# Patient Record
Sex: FEMALE | Race: WHITE | Employment: FULL TIME | URBAN - METROPOLITAN AREA
[De-identification: names, ages, dates, MRNs, and addresses within clinical notes are randomized per-mention and may not be internally consistent; named-entity substitution may affect disease eponyms.]

---

## 2018-09-14 ENCOUNTER — APPOINTMENT (OUTPATIENT)
Dept: GENERAL RADIOLOGY | Age: 28
End: 2018-09-14
Payer: COMMERCIAL

## 2018-09-14 ENCOUNTER — HOSPITAL ENCOUNTER (EMERGENCY)
Age: 28
Discharge: HOME | End: 2018-09-14
Attending: EMERGENCY MEDICINE
Payer: COMMERCIAL

## 2018-09-14 VITALS
DIASTOLIC BLOOD PRESSURE: 67 MMHG | OXYGEN SATURATION: 97 % | SYSTOLIC BLOOD PRESSURE: 124 MMHG | TEMPERATURE: 101.2 F | WEIGHT: 133 LBS | RESPIRATION RATE: 14 BRPM | HEIGHT: 63 IN | HEART RATE: 90 BPM | BODY MASS INDEX: 23.57 KG/M2

## 2018-09-14 DIAGNOSIS — J18.9 COMMUNITY ACQUIRED PNEUMONIA OF RIGHT LOWER LOBE OF LUNG: Primary | ICD-10-CM

## 2018-09-14 LAB
ALBUMIN SERPL-MCNC: 4.6 G/DL (ref 3.5–5.2)
ALP BLD-CCNC: 86 U/L (ref 35–104)
ALT SERPL-CCNC: 16 U/L (ref 0–32)
ANION GAP SERPL CALCULATED.3IONS-SCNC: 14 MMOL/L (ref 7–16)
AST SERPL-CCNC: 19 U/L (ref 0–31)
BACTERIA: ABNORMAL /HPF
BASOPHILS ABSOLUTE: 0.04 E9/L (ref 0–0.2)
BASOPHILS RELATIVE PERCENT: 0.3 % (ref 0–2)
BILIRUB SERPL-MCNC: 0.6 MG/DL (ref 0–1.2)
BILIRUBIN URINE: NEGATIVE
BLOOD, URINE: NORMAL
BUN BLDV-MCNC: 10 MG/DL (ref 6–20)
C-REACTIVE PROTEIN: 8.8 MG/DL (ref 0–0.4)
CALCIUM SERPL-MCNC: 9.5 MG/DL (ref 8.6–10.2)
CHLORIDE BLD-SCNC: 98 MMOL/L (ref 98–107)
CHP ED QC CHECK: NORMAL
CLARITY: CLEAR
CO2: 23 MMOL/L (ref 22–29)
COLOR: YELLOW
CREAT SERPL-MCNC: 0.8 MG/DL (ref 0.5–1)
EKG ATRIAL RATE: 103 BPM
EKG P AXIS: 56 DEGREES
EKG P-R INTERVAL: 152 MS
EKG Q-T INTERVAL: 322 MS
EKG QRS DURATION: 88 MS
EKG QTC CALCULATION (BAZETT): 421 MS
EKG R AXIS: 5 DEGREES
EKG T AXIS: 33 DEGREES
EKG VENTRICULAR RATE: 103 BPM
EOSINOPHILS ABSOLUTE: 0 E9/L (ref 0.05–0.5)
EOSINOPHILS RELATIVE PERCENT: 0 % (ref 0–6)
EPITHELIAL CELLS, UA: ABNORMAL /HPF
GFR AFRICAN AMERICAN: >60
GFR NON-AFRICAN AMERICAN: >60 ML/MIN/1.73
GLUCOSE BLD-MCNC: 107 MG/DL (ref 74–109)
GLUCOSE URINE: NEGATIVE MG/DL
HCT VFR BLD CALC: 39.8 % (ref 34–48)
HEMOGLOBIN: 13.4 G/DL (ref 11.5–15.5)
IMMATURE GRANULOCYTES #: 0.06 E9/L
IMMATURE GRANULOCYTES %: 0.5 % (ref 0–5)
KETONES, URINE: NEGATIVE MG/DL
LACTIC ACID: 0.9 MMOL/L (ref 0.5–2.2)
LEUKOCYTE ESTERASE, URINE: NEGATIVE
LIPASE: 15 U/L (ref 13–60)
LYMPHOCYTES ABSOLUTE: 0.94 E9/L (ref 1.5–4)
LYMPHOCYTES RELATIVE PERCENT: 7.3 % (ref 20–42)
MCH RBC QN AUTO: 30.7 PG (ref 26–35)
MCHC RBC AUTO-ENTMCNC: 33.7 % (ref 32–34.5)
MCV RBC AUTO: 91.1 FL (ref 80–99.9)
MONOCYTES ABSOLUTE: 0.77 E9/L (ref 0.1–0.95)
MONOCYTES RELATIVE PERCENT: 6 % (ref 2–12)
NEUTROPHILS ABSOLUTE: 11.09 E9/L (ref 1.8–7.3)
NEUTROPHILS RELATIVE PERCENT: 85.9 % (ref 43–80)
NITRITE, URINE: NEGATIVE
PDW BLD-RTO: 12.5 FL (ref 11.5–15)
PH UA: 7.5 (ref 5–9)
PLATELET # BLD: 246 E9/L (ref 130–450)
PMV BLD AUTO: 10.1 FL (ref 7–12)
POTASSIUM SERPL-SCNC: 3.8 MMOL/L (ref 3.5–5)
PREGNANCY TEST URINE, POC: NEGATIVE
PROTEIN UA: NEGATIVE MG/DL
RBC # BLD: 4.37 E12/L (ref 3.5–5.5)
RBC UA: ABNORMAL /HPF (ref 0–2)
SEDIMENTATION RATE, ERYTHROCYTE: 38 MM/HR (ref 0–20)
SODIUM BLD-SCNC: 135 MMOL/L (ref 132–146)
SPECIFIC GRAVITY UA: 1.01 (ref 1–1.03)
TOTAL PROTEIN: 8.3 G/DL (ref 6.4–8.3)
UROBILINOGEN, URINE: 0.2 E.U./DL
WBC # BLD: 12.9 E9/L (ref 4.5–11.5)
WBC UA: ABNORMAL /HPF (ref 0–5)

## 2018-09-14 PROCEDURE — 6370000000 HC RX 637 (ALT 250 FOR IP): Performed by: EMERGENCY MEDICINE

## 2018-09-14 PROCEDURE — 86140 C-REACTIVE PROTEIN: CPT

## 2018-09-14 PROCEDURE — 36415 COLL VENOUS BLD VENIPUNCTURE: CPT

## 2018-09-14 PROCEDURE — 85025 COMPLETE CBC W/AUTO DIFF WBC: CPT

## 2018-09-14 PROCEDURE — 96365 THER/PROPH/DIAG IV INF INIT: CPT

## 2018-09-14 PROCEDURE — 6360000002 HC RX W HCPCS: Performed by: EMERGENCY MEDICINE

## 2018-09-14 PROCEDURE — 83690 ASSAY OF LIPASE: CPT

## 2018-09-14 PROCEDURE — 85651 RBC SED RATE NONAUTOMATED: CPT

## 2018-09-14 PROCEDURE — 87088 URINE BACTERIA CULTURE: CPT

## 2018-09-14 PROCEDURE — 71045 X-RAY EXAM CHEST 1 VIEW: CPT

## 2018-09-14 PROCEDURE — 83605 ASSAY OF LACTIC ACID: CPT

## 2018-09-14 PROCEDURE — 94664 DEMO&/EVAL PT USE INHALER: CPT

## 2018-09-14 PROCEDURE — 93005 ELECTROCARDIOGRAM TRACING: CPT | Performed by: EMERGENCY MEDICINE

## 2018-09-14 PROCEDURE — 80053 COMPREHEN METABOLIC PANEL: CPT

## 2018-09-14 PROCEDURE — 96375 TX/PRO/DX INJ NEW DRUG ADDON: CPT

## 2018-09-14 PROCEDURE — 2580000003 HC RX 258: Performed by: EMERGENCY MEDICINE

## 2018-09-14 PROCEDURE — 87040 BLOOD CULTURE FOR BACTERIA: CPT

## 2018-09-14 PROCEDURE — 81001 URINALYSIS AUTO W/SCOPE: CPT

## 2018-09-14 PROCEDURE — 99283 EMERGENCY DEPT VISIT LOW MDM: CPT

## 2018-09-14 RX ORDER — MULTIVIT-MIN/IRON/FOLIC ACID/K 18-600-40
CAPSULE ORAL
COMMUNITY
End: 2021-09-16

## 2018-09-14 RX ORDER — ONDANSETRON 2 MG/ML
4 INJECTION INTRAMUSCULAR; INTRAVENOUS ONCE
Status: COMPLETED | OUTPATIENT
Start: 2018-09-14 | End: 2018-09-14

## 2018-09-14 RX ORDER — 0.9 % SODIUM CHLORIDE 0.9 %
1000 INTRAVENOUS SOLUTION INTRAVENOUS ONCE
Status: COMPLETED | OUTPATIENT
Start: 2018-09-14 | End: 2018-09-14

## 2018-09-14 RX ORDER — DOXYCYCLINE HYCLATE 100 MG
100 TABLET ORAL 2 TIMES DAILY
Qty: 20 TABLET | Refills: 0 | Status: SHIPPED | OUTPATIENT
Start: 2018-09-14 | End: 2018-09-24

## 2018-09-14 RX ORDER — SODIUM CHLORIDE 0.9 % (FLUSH) 0.9 %
10 SYRINGE (ML) INJECTION PRN
Status: DISCONTINUED | OUTPATIENT
Start: 2018-09-14 | End: 2018-09-14 | Stop reason: HOSPADM

## 2018-09-14 RX ORDER — KETOROLAC TROMETHAMINE 30 MG/ML
30 INJECTION, SOLUTION INTRAMUSCULAR; INTRAVENOUS ONCE
Status: COMPLETED | OUTPATIENT
Start: 2018-09-14 | End: 2018-09-14

## 2018-09-14 RX ORDER — ONDANSETRON 4 MG/1
4 TABLET, ORALLY DISINTEGRATING ORAL EVERY 8 HOURS PRN
Qty: 10 TABLET | Refills: 0 | Status: SHIPPED | OUTPATIENT
Start: 2018-09-14 | End: 2019-09-14

## 2018-09-14 RX ORDER — CEFDINIR 300 MG/1
300 CAPSULE ORAL 2 TIMES DAILY
Qty: 20 CAPSULE | Refills: 0 | Status: SHIPPED | OUTPATIENT
Start: 2018-09-14 | End: 2018-09-24

## 2018-09-14 RX ORDER — ACETAMINOPHEN 325 MG/1
650 TABLET ORAL ONCE
Status: COMPLETED | OUTPATIENT
Start: 2018-09-14 | End: 2018-09-14

## 2018-09-14 RX ORDER — IPRATROPIUM BROMIDE AND ALBUTEROL SULFATE 2.5; .5 MG/3ML; MG/3ML
1 SOLUTION RESPIRATORY (INHALATION) ONCE
Status: COMPLETED | OUTPATIENT
Start: 2018-09-14 | End: 2018-09-14

## 2018-09-14 RX ORDER — FLUOXETINE HYDROCHLORIDE 20 MG/1
40 CAPSULE ORAL DAILY
COMMUNITY
End: 2021-09-16

## 2018-09-14 RX ADMIN — SODIUM CHLORIDE 1000 ML: 9 INJECTION, SOLUTION INTRAVENOUS at 10:18

## 2018-09-14 RX ADMIN — ONDANSETRON 4 MG: 2 INJECTION INTRAMUSCULAR; INTRAVENOUS at 10:18

## 2018-09-14 RX ADMIN — SODIUM CHLORIDE 1000 ML: 9 INJECTION, SOLUTION INTRAVENOUS at 11:20

## 2018-09-14 RX ADMIN — CEFTRIAXONE 1 G: 1 INJECTION, POWDER, FOR SOLUTION INTRAMUSCULAR; INTRAVENOUS at 11:20

## 2018-09-14 RX ADMIN — KETOROLAC TROMETHAMINE 30 MG: 30 INJECTION, SOLUTION INTRAMUSCULAR at 10:18

## 2018-09-14 RX ADMIN — ACETAMINOPHEN 650 MG: 325 TABLET ORAL at 12:18

## 2018-09-14 RX ADMIN — Medication 10 ML: at 10:18

## 2018-09-14 RX ADMIN — IPRATROPIUM BROMIDE AND ALBUTEROL SULFATE 1 AMPULE: .5; 3 SOLUTION RESPIRATORY (INHALATION) at 12:03

## 2018-09-14 ASSESSMENT — ENCOUNTER SYMPTOMS
ABDOMINAL PAIN: 0
BACK PAIN: 0
COUGH: 1
BLOOD IN STOOL: 0
NAUSEA: 1
VOMITING: 1
SHORTNESS OF BREATH: 0

## 2018-09-14 ASSESSMENT — PAIN DESCRIPTION - PAIN TYPE: TYPE: ACUTE PAIN

## 2018-09-14 ASSESSMENT — PAIN DESCRIPTION - LOCATION: LOCATION: HEAD

## 2018-09-14 ASSESSMENT — PAIN SCALES - GENERAL
PAINLEVEL_OUTOF10: 5

## 2018-09-14 NOTE — ED NOTES
ATTENDING PROVIDER ATTESTATION:     Chato Buckner presented to the emergency department for evaluation of Fever (took tyl last at 8 am ); Headache; Dizziness; and Emesis   and was initially evaluated by the Medical Resident. See Original ED Note for H&P and ED course above. I have reviewed and discussed the case, including pertinent history (medical, surgical, family and social) and exam findings with the Medical Resident assigned to Chato Buckner. I have personally performed and/or participated in the history, exam, medical decision making, and procedures and agree with all pertinent clinical information and any additional changes or corrections are noted below in my assessment and plan. I have discussed this patient in detail with the resident, and provided the instruction and education,       I have reviewed my findings and recommendations with the assigned Medical Resident, Chato Buckner and members of family present at the time of disposition. My Assessment/Plan: The following summarizes my clinical findings and independent assessment:     History:  Chato Buckner is a 32 y.o. female presenting to the ED for fever, beginning 5-6 days ago. The complaint has been constant, moderate in severity, and worsened by nothing. Cough, wheezing, shortness of breath. Sick for a few days. Denies other complaints. Still eating and drinking. Still taking PO. Review of Systems:   Pertinent positives and negatives are stated within HPI, all other systems reviewed and are negative.    --------------------------------------------- PAST HISTORY ---------------------------------------------  Past Medical History:  has a past medical history of Asthma; Eczema; Environmental allergies; Left ovarian cyst; and Scoliosis. Past Surgical History:  has a past surgical history that includes Ovarian cyst surgery. Social History:  reports that she quit smoking about 5 years ago.  Her smoking use included Cigarettes. She smoked 0.50 packs per day. She has never used smokeless tobacco. She reports that she does not drink alcohol or use drugs. Family History: family history is not on file. The patients home medications have been reviewed. Allergies: Spearmint flavor  --------------------------------------------------------------------------------------------------------------  Nursing notes and vital signs reviewed             On My Exam:    Constitutional/General: Alert and oriented x3  Head: Normocephalic and atraumatic  Eyes: PERRL, EOMI, conjunctiva normal, sclera non icteric  Mouth: Oropharynx clear, handling secretions, no trismus, no asymmetry of the posterior oropharynx or uvular edema  Neck: Supple, full ROM, no stridor, no meningeal signs  Respiratory: Lungs with rhonchi and wheezes bilaterally. Not in respiratory distress  Cardiovascular:  Tachycardic but Regular rhythm. No murmurs, no aortic murmurs, no gallops, or rubs. 2+ distal pulses. Equal extremity pulses. Chest: No chest wall tenderness  GI:  Abdomen Soft, Non tender, Non distended. +BS. No rebound, guarding, or rigidity. No pulsatile masses. Musculoskeletal: Moves all extremities x 4. Warm and well perfused, no clubbing, cyanosis, or edema. Capillary refill <3 seconds  Integument: skin warm and dry. No rashes. Neurologic: GCS 15, no focal deficits        Medical Decision Making:  CAP  IVF  Antibiotics  Improving  HR improved  She is feeling better  She is appropriate for outpatient therapy      DIAGNOSIS:  1.  Community acquired pneumonia of right lower lobe of lung ([facility] [address]                         Jane Hurt MD  09/14/18 [id number]

## 2018-09-14 NOTE — ED PROVIDER NOTES
59-year-old female presents to emergency department with fevers cough dizziness and headaches worsening over the last 5 or 6 days. Patient also was recently diagnosed with strep and treated 2 weeks ago. The patient states she has had some dry heaves and difficulty tolerating by mouth. She states her fevers but as high as 102.6 she states mild head congestion and states she is just not getting better so desired to be evaluated. She is denying vision changes she is denying neurological deficits she is denying chest pain she is denying diarrhea or constipation she is denying dysuria. The history is provided by the patient. Fever   Max temp prior to arrival:  102.6  Temp source:  Oral  Severity:  Moderate  Onset quality:  Gradual  Duration:  6 days  Timing:  Intermittent  Progression:  Waxing and waning  Chronicity:  New  Relieved by:  Nothing  Worsened by:  Nothing  Associated symptoms: chills, congestion, cough, headaches, nausea and vomiting    Associated symptoms: no chest pain, no dysuria and no rash    Risk factors: recent sickness        Review of Systems   Constitutional: Positive for chills and fever. HENT: Positive for congestion. Respiratory: Positive for cough. Negative for shortness of breath. Cardiovascular: Negative for chest pain. Gastrointestinal: Positive for nausea and vomiting. Negative for abdominal pain and blood in stool. Genitourinary: Negative for dysuria and frequency. Musculoskeletal: Negative for back pain. Skin: Negative for rash. Neurological: Positive for headaches. Negative for weakness. All other systems reviewed and are negative. Physical Exam   Constitutional: She is oriented to person, place, and time. She appears well-developed and well-nourished. HENT:   Head: Normocephalic and atraumatic. Nose:       Mouth/Throat: Uvula is midline and oropharynx is clear and moist. No oropharyngeal exudate or posterior oropharyngeal edema.    Neck: Normal range of motion and full passive range of motion without pain. Neck supple. No spinous process tenderness and no muscular tenderness present. No Kernig's sign noted. Cardiovascular: Regular rhythm. Tachycardia present. Pulmonary/Chest: Effort normal. No tachypnea. She has rales in the right lower field. Abdominal: Soft. Normal appearance and bowel sounds are normal. There is no tenderness. There is no rigidity, no rebound and no guarding. Neurological: She is alert and oriented to person, place, and time. She has normal strength. No cranial nerve deficit or sensory deficit. She exhibits normal muscle tone. She displays a negative Romberg sign. Coordination normal.   Skin: She is not diaphoretic. Vitals reviewed. Procedures    MDM  --------------------------------------------- PAST HISTORY ---------------------------------------------  Past Medical History:  has a past medical history of Asthma; Eczema; Environmental allergies; Left ovarian cyst; and Scoliosis. Past Surgical History:  has a past surgical history that includes Ovarian cyst surgery. Social History:  reports that she quit smoking about 5 years ago. Her smoking use included Cigarettes. She smoked 0.50 packs per day. She has never used smokeless tobacco. She reports that she does not drink alcohol or use drugs. Family History: family history is not on file. The patients home medications have been reviewed.     Allergies: Spearmint flavor    -------------------------------------------------- RESULTS -------------------------------------------------    LABS:  Results for orders placed or performed during the hospital encounter of 09/14/18   CBC Auto Differential   Result Value Ref Range    WBC 12.9 (H) 4.5 - 11.5 E9/L    RBC 4.37 3.50 - 5.50 E12/L    Hemoglobin 13.4 11.5 - 15.5 g/dL    Hematocrit 39.8 34.0 - 48.0 %    MCV 91.1 80.0 - 99.9 fL    MCH 30.7 26.0 - 35.0 pg    MCHC 33.7 32.0 - 34.5 %    RDW 12.5 11.5 - 15.0 fL Platelets 555 349 - 900 E9/L    MPV 10.1 7.0 - 12.0 fL    Neutrophils % 85.9 (H) 43.0 - 80.0 %    Immature Granulocytes % 0.5 0.0 - 5.0 %    Lymphocytes % 7.3 (L) 20.0 - 42.0 %    Monocytes % 6.0 2.0 - 12.0 %    Eosinophils % 0.0 0.0 - 6.0 %    Basophils % 0.3 0.0 - 2.0 %    Neutrophils # 11.09 (H) 1.80 - 7.30 E9/L    Immature Granulocytes # 0.06 E9/L    Lymphocytes # 0.94 (L) 1.50 - 4.00 E9/L    Monocytes # 0.77 0.10 - 0.95 E9/L    Eosinophils # 0.00 (L) 0.05 - 0.50 E9/L    Basophils # 0.04 0.00 - 0.20 E9/L   Comprehensive Metabolic Panel   Result Value Ref Range    Sodium 135 132 - 146 mmol/L    Potassium 3.8 3.5 - 5.0 mmol/L    Chloride 98 98 - 107 mmol/L    CO2 23 22 - 29 mmol/L    Anion Gap 14 7 - 16 mmol/L    Glucose 107 74 - 109 mg/dL    BUN 10 6 - 20 mg/dL    CREATININE 0.8 0.5 - 1.0 mg/dL    GFR Non-African American >60 >=60 mL/min/1.73    GFR African American >60     Calcium 9.5 8.6 - 10.2 mg/dL    Total Protein 8.3 6.4 - 8.3 g/dL    Alb 4.6 3.5 - 5.2 g/dL    Total Bilirubin 0.6 0.0 - 1.2 mg/dL    Alkaline Phosphatase 86 35 - 104 U/L    ALT 16 0 - 32 U/L    AST 19 0 - 31 U/L   Lactic Acid, Plasma   Result Value Ref Range    Lactic Acid 0.9 0.5 - 2.2 mmol/L   Lipase   Result Value Ref Range    Lipase 15 13 - 60 U/L   Urinalysis   Result Value Ref Range    Color, UA Yellow Straw/Yellow    Clarity, UA Clear Clear    Glucose, Ur Negative Negative mg/dL    Bilirubin Urine Negative Negative    Ketones, Urine Negative Negative mg/dL    Specific Gravity, UA 1.010 1.005 - 1.030    Blood, Urine TRACE-LYSED Negative    pH, UA 7.5 5.0 - 9.0    Protein, UA Negative Negative mg/dL    Urobilinogen, Urine 0.2 <2.0 E.U./dL    Nitrite, Urine Negative Negative    Leukocyte Esterase, Urine Negative Negative   Microscopic Urinalysis   Result Value Ref Range    WBC, UA 0-1 0 - 5 /HPF    RBC, UA 1-3 0 - 2 /HPF    Epi Cells MODERATE /HPF    Bacteria, UA RARE (A) /HPF   POC Pregnancy Urine   Result Value Ref Range    Preg Test, Ur

## 2018-09-15 LAB — URINE CULTURE, ROUTINE: NORMAL

## 2018-09-19 LAB
BLOOD CULTURE, ROUTINE: NORMAL
CULTURE, BLOOD 2: NORMAL

## 2019-08-16 ENCOUNTER — PREP FOR PROCEDURE (OUTPATIENT)
Dept: OBGYN | Age: 29
End: 2019-08-16

## 2019-08-16 RX ORDER — ONDANSETRON 2 MG/ML
4 INJECTION INTRAMUSCULAR; INTRAVENOUS EVERY 6 HOURS PRN
Status: DISCONTINUED | OUTPATIENT
Start: 2019-08-16 | End: 2022-03-27 | Stop reason: ALTCHOICE

## 2019-08-16 RX ORDER — SODIUM CHLORIDE, SODIUM LACTATE, POTASSIUM CHLORIDE, CALCIUM CHLORIDE 600; 310; 30; 20 MG/100ML; MG/100ML; MG/100ML; MG/100ML
INJECTION, SOLUTION INTRAVENOUS CONTINUOUS
Status: DISCONTINUED | OUTPATIENT
Start: 2019-08-16 | End: 2022-03-27 | Stop reason: ALTCHOICE

## 2019-08-25 ENCOUNTER — HOSPITAL ENCOUNTER (INPATIENT)
Age: 29
LOS: 1 days | Discharge: HOME | DRG: 560 | End: 2019-08-26
Attending: OBSTETRICS & GYNECOLOGY | Admitting: OBSTETRICS & GYNECOLOGY
Payer: COMMERCIAL

## 2019-08-25 ENCOUNTER — APPOINTMENT (OUTPATIENT)
Dept: LABOR AND DELIVERY | Age: 29
DRG: 560 | End: 2019-08-25
Payer: COMMERCIAL

## 2019-08-25 LAB
ABO/RH: NORMAL
AMNISURE, POC: POSITIVE
ANTIBODY SCREEN: NORMAL
HCT VFR BLD CALC: 36.3 % (ref 34–48)
HEMOGLOBIN: 11.7 G/DL (ref 11.5–15.5)
Lab: NORMAL
MCH RBC QN AUTO: 27.8 PG (ref 26–35)
MCHC RBC AUTO-ENTMCNC: 32.2 % (ref 32–34.5)
MCV RBC AUTO: 86.2 FL (ref 80–99.9)
NEGATIVE QC PASS/FAIL: NORMAL
PDW BLD-RTO: 13.2 FL (ref 11.5–15)
PLATELET # BLD: 208 E9/L (ref 130–450)
PMV BLD AUTO: 11.5 FL (ref 7–12)
POSITIVE QC PASS/FAIL: NORMAL
RBC # BLD: 4.21 E12/L (ref 3.5–5.5)
WBC # BLD: 8.7 E9/L (ref 4.5–11.5)

## 2019-08-25 PROCEDURE — 36415 COLL VENOUS BLD VENIPUNCTURE: CPT

## 2019-08-25 PROCEDURE — 2500000003 HC RX 250 WO HCPCS: Performed by: OBSTETRICS & GYNECOLOGY

## 2019-08-25 PROCEDURE — 6360000002 HC RX W HCPCS

## 2019-08-25 PROCEDURE — 84112 EVAL AMNIOTIC FLUID PROTEIN: CPT

## 2019-08-25 PROCEDURE — 85027 COMPLETE CBC AUTOMATED: CPT

## 2019-08-25 PROCEDURE — 7200000001 HC VAGINAL DELIVERY

## 2019-08-25 PROCEDURE — 86850 RBC ANTIBODY SCREEN: CPT

## 2019-08-25 PROCEDURE — 1220000000 HC SEMI PRIVATE OB R&B

## 2019-08-25 PROCEDURE — 0KQM0ZZ REPAIR PERINEUM MUSCLE, OPEN APPROACH: ICD-10-PCS | Performed by: OBSTETRICS & GYNECOLOGY

## 2019-08-25 PROCEDURE — 3E0P7VZ INTRODUCTION OF HORMONE INTO FEMALE REPRODUCTIVE, VIA NATURAL OR ARTIFICIAL OPENING: ICD-10-PCS | Performed by: OBSTETRICS & GYNECOLOGY

## 2019-08-25 PROCEDURE — 86901 BLOOD TYPING SEROLOGIC RH(D): CPT

## 2019-08-25 PROCEDURE — 86900 BLOOD TYPING SEROLOGIC ABO: CPT

## 2019-08-25 PROCEDURE — 6370000000 HC RX 637 (ALT 250 FOR IP): Performed by: OBSTETRICS & GYNECOLOGY

## 2019-08-25 RX ORDER — FAMOTIDINE 20 MG/1
20 TABLET, FILM COATED ORAL 2 TIMES DAILY
Status: DISCONTINUED | OUTPATIENT
Start: 2019-08-25 | End: 2019-08-26 | Stop reason: HOSPADM

## 2019-08-25 RX ORDER — LIDOCAINE HYDROCHLORIDE 10 MG/ML
20 INJECTION, SOLUTION EPIDURAL; INFILTRATION; INTRACAUDAL; PERINEURAL ONCE
Status: COMPLETED | OUTPATIENT
Start: 2019-08-25 | End: 2019-08-25

## 2019-08-25 RX ORDER — SODIUM CHLORIDE 0.9 % (FLUSH) 0.9 %
10 SYRINGE (ML) INJECTION PRN
Status: DISCONTINUED | OUTPATIENT
Start: 2019-08-25 | End: 2019-08-26 | Stop reason: HOSPADM

## 2019-08-25 RX ORDER — FLUOXETINE HYDROCHLORIDE 20 MG/1
40 CAPSULE ORAL DAILY
Status: DISCONTINUED | OUTPATIENT
Start: 2019-08-25 | End: 2019-08-26 | Stop reason: HOSPADM

## 2019-08-25 RX ORDER — IBUPROFEN 800 MG/1
800 TABLET ORAL EVERY 8 HOURS PRN
Status: DISCONTINUED | OUTPATIENT
Start: 2019-08-25 | End: 2019-08-26 | Stop reason: HOSPADM

## 2019-08-25 RX ORDER — SODIUM CHLORIDE, SODIUM LACTATE, POTASSIUM CHLORIDE, CALCIUM CHLORIDE 600; 310; 30; 20 MG/100ML; MG/100ML; MG/100ML; MG/100ML
INJECTION, SOLUTION INTRAVENOUS CONTINUOUS
Status: DISCONTINUED | OUTPATIENT
Start: 2019-08-25 | End: 2019-08-26 | Stop reason: HOSPADM

## 2019-08-25 RX ORDER — ACETAMINOPHEN 325 MG/1
650 TABLET ORAL EVERY 4 HOURS PRN
Status: DISCONTINUED | OUTPATIENT
Start: 2019-08-25 | End: 2019-08-26 | Stop reason: HOSPADM

## 2019-08-25 RX ORDER — LANOLIN 100 %
OINTMENT (GRAM) TOPICAL PRN
Status: DISCONTINUED | OUTPATIENT
Start: 2019-08-25 | End: 2019-08-26 | Stop reason: HOSPADM

## 2019-08-25 RX ORDER — SODIUM CHLORIDE 0.9 % (FLUSH) 0.9 %
10 SYRINGE (ML) INJECTION EVERY 12 HOURS SCHEDULED
Status: DISCONTINUED | OUTPATIENT
Start: 2019-08-25 | End: 2019-08-26 | Stop reason: HOSPADM

## 2019-08-25 RX ORDER — LIDOCAINE HYDROCHLORIDE 10 MG/ML
INJECTION, SOLUTION INFILTRATION; PERINEURAL
Status: DISPENSED
Start: 2019-08-25 | End: 2019-08-25

## 2019-08-25 RX ORDER — DOCUSATE SODIUM 100 MG/1
100 CAPSULE, LIQUID FILLED ORAL 2 TIMES DAILY
Status: DISCONTINUED | OUTPATIENT
Start: 2019-08-25 | End: 2019-08-26 | Stop reason: HOSPADM

## 2019-08-25 RX ORDER — ACETAMINOPHEN 650 MG
TABLET, EXTENDED RELEASE ORAL
Status: COMPLETED
Start: 2019-08-25 | End: 2019-08-25

## 2019-08-25 RX ORDER — ACETAMINOPHEN 650 MG
TABLET, EXTENDED RELEASE ORAL PRN
Status: DISCONTINUED | OUTPATIENT
Start: 2019-08-25 | End: 2019-08-26 | Stop reason: HOSPADM

## 2019-08-25 RX ORDER — NIFEDIPINE 30 MG/1
30 TABLET, FILM COATED, EXTENDED RELEASE ORAL ONCE
Status: COMPLETED | OUTPATIENT
Start: 2019-08-25 | End: 2019-08-25

## 2019-08-25 RX ADMIN — DOCUSATE SODIUM 100 MG: 100 CAPSULE, LIQUID FILLED ORAL at 21:25

## 2019-08-25 RX ADMIN — IBUPROFEN 800 MG: 800 TABLET, FILM COATED ORAL at 13:04

## 2019-08-25 RX ADMIN — Medication: at 02:10

## 2019-08-25 RX ADMIN — IBUPROFEN 800 MG: 800 TABLET, FILM COATED ORAL at 21:25

## 2019-08-25 RX ADMIN — NIFEDIPINE 30 MG: 30 TABLET, EXTENDED RELEASE ORAL at 04:26

## 2019-08-25 RX ADMIN — DOCUSATE SODIUM 100 MG: 100 CAPSULE, LIQUID FILLED ORAL at 10:16

## 2019-08-25 RX ADMIN — IBUPROFEN 800 MG: 800 TABLET, FILM COATED ORAL at 02:55

## 2019-08-25 RX ADMIN — BENZOCAINE AND LEVOMENTHOL: 200; 5 SPRAY TOPICAL at 10:17

## 2019-08-25 RX ADMIN — Medication 999 MILLI-UNITS/MIN: at 02:14

## 2019-08-25 RX ADMIN — ACETAMINOPHEN 650 MG: 325 TABLET ORAL at 18:21

## 2019-08-25 RX ADMIN — LIDOCAINE HYDROCHLORIDE 20 ML: 10 INJECTION, SOLUTION EPIDURAL; INFILTRATION; INTRACAUDAL; PERINEURAL at 02:14

## 2019-08-25 ASSESSMENT — PAIN SCALES - GENERAL
PAINLEVEL_OUTOF10: 3
PAINLEVEL_OUTOF10: 4

## 2019-08-25 NOTE — PROGRESS NOTES
Called and updated Dr. Jim Blinks on blood pressures, new order received for procardia and states okay to send to mom/baby.

## 2019-08-25 NOTE — H&P
CHIEF COMPLAINT:  contractions, leakage of amniotic fluid    HISTORY OF PRESENT ILLNESS:      The patient is a 29 y.o. female at 39w0d.   OB History        3    Para   1    Term   1            AB        Living   1       SAB        TAB        Ectopic        Molar        Multiple        Live Births   1            Patient presents with a chief complaint as above and is being admitted for active phase labor    Estimated Due Date: Estimated Date of Delivery: 19    PRENATAL CARE:    Complicated by: none    PAST OB HISTORY  OB History        3    Para   1    Term   1            AB        Living   1       SAB        TAB        Ectopic        Molar        Multiple        Live Births   1                Past Medical History:        Diagnosis Date    Asthma     Exercise Induced    Eczema     Environmental allergies     Left ovarian cyst     Scoliosis      Past Surgical History:        Procedure Laterality Date    OVARIAN CYST SURGERY       Allergies:  Spearmint flavor  Social History:    Social History     Socioeconomic History    Marital status: Single     Spouse name: Not on file    Number of children: Not on file    Years of education: Not on file    Highest education level: Not on file   Occupational History    Not on file   Social Needs    Financial resource strain: Not on file    Food insecurity:     Worry: Not on file     Inability: Not on file    Transportation needs:     Medical: Not on file     Non-medical: Not on file   Tobacco Use    Smoking status: Former Smoker     Packs/day: 0.50     Types: Cigarettes     Last attempt to quit: 2013     Years since quittin.9    Smokeless tobacco: Never Used   Substance and Sexual Activity    Alcohol use: No     Comment: socially    Drug use: No    Sexual activity: Not on file   Lifestyle    Physical activity:     Days per week: Not on file     Minutes per session: Not on file    Stress: Not on file   Relationships    Intact    ASSESSMENT AND PLAN:    Labor: admit  Fetus: Reassuring  GBS: No  Other: d/w Dr Wanda Aguirre      Electronically signed by Viola Deluca DO on 8/25/2019 at 1:56 AM

## 2019-08-25 NOTE — PROGRESS NOTES
presents to L&D with c/o ctx that started about an hour and a half ago, states was 3cm on Friday, states unsure if water broke, amnisure started at [address], denies any vb, states positive fetal movement, states preeclampsia with last pregnancy.

## 2019-08-26 VITALS
HEIGHT: 63 IN | RESPIRATION RATE: 18 BRPM | DIASTOLIC BLOOD PRESSURE: 80 MMHG | WEIGHT: 173 LBS | SYSTOLIC BLOOD PRESSURE: 129 MMHG | TEMPERATURE: 98.2 F | BODY MASS INDEX: 30.65 KG/M2 | HEART RATE: 72 BPM

## 2019-08-26 LAB
HCT VFR BLD CALC: 33.1 % (ref 34–48)
HEMOGLOBIN: 10.4 G/DL (ref 11.5–15.5)
MCH RBC QN AUTO: 27.3 PG (ref 26–35)
MCHC RBC AUTO-ENTMCNC: 31.4 % (ref 32–34.5)
MCV RBC AUTO: 86.9 FL (ref 80–99.9)
PDW BLD-RTO: 13.2 FL (ref 11.5–15)
PLATELET # BLD: 184 E9/L (ref 130–450)
PMV BLD AUTO: 11.1 FL (ref 7–12)
RBC # BLD: 3.81 E12/L (ref 3.5–5.5)
WBC # BLD: 7.9 E9/L (ref 4.5–11.5)

## 2019-08-26 PROCEDURE — 36415 COLL VENOUS BLD VENIPUNCTURE: CPT

## 2019-08-26 PROCEDURE — 85027 COMPLETE CBC AUTOMATED: CPT

## 2019-08-26 PROCEDURE — 6370000000 HC RX 637 (ALT 250 FOR IP): Performed by: OBSTETRICS & GYNECOLOGY

## 2019-08-26 RX ORDER — IBUPROFEN 800 MG/1
800 TABLET ORAL EVERY 8 HOURS PRN
Qty: 120 TABLET | Refills: 3 | Status: SHIPPED | OUTPATIENT
Start: 2019-08-26 | End: 2021-09-16

## 2019-08-26 RX ADMIN — FAMOTIDINE 20 MG: 20 TABLET ORAL at 08:09

## 2019-08-26 RX ADMIN — IBUPROFEN 800 MG: 800 TABLET, FILM COATED ORAL at 08:09

## 2019-08-26 RX ADMIN — DOCUSATE SODIUM 100 MG: 100 CAPSULE, LIQUID FILLED ORAL at 08:09

## 2019-08-26 RX ADMIN — ACETAMINOPHEN 650 MG: 325 TABLET ORAL at 00:20

## 2019-08-26 ASSESSMENT — PAIN SCALES - GENERAL
PAINLEVEL_OUTOF10: 2
PAINLEVEL_OUTOF10: 3

## 2019-08-26 NOTE — PROGRESS NOTES
Maternal/ discharge teaching and instructions reviewed with pt and . Verbalized understanding. Copy of instructions handed to pt.

## 2019-08-26 NOTE — PROGRESS NOTES
CLINICAL PHARMACY NOTE: MEDS TO [address] Select Patient?: No  Total # of Prescriptions Filled: 1   The following medications were delivered to the patient:  · Motrin 800  Total # of Interventions Completed: 3  Time Spent (min): 45    Additional Documentation:

## 2021-09-29 ENCOUNTER — OFFICE VISIT (OUTPATIENT)
Dept: FAMILY MEDICINE CLINIC | Age: 31
End: 2021-09-29
Payer: COMMERCIAL

## 2021-09-29 VITALS
DIASTOLIC BLOOD PRESSURE: 80 MMHG | BODY MASS INDEX: 25.15 KG/M2 | HEART RATE: 81 BPM | OXYGEN SATURATION: 99 % | TEMPERATURE: 97.9 F | WEIGHT: 142 LBS | SYSTOLIC BLOOD PRESSURE: 126 MMHG

## 2021-09-29 DIAGNOSIS — J02.9 SORE THROAT: Primary | ICD-10-CM

## 2021-09-29 DIAGNOSIS — R59.0 CERVICAL LYMPHADENOPATHY: ICD-10-CM

## 2021-09-29 DIAGNOSIS — H92.01 ACUTE OTALGIA, RIGHT: ICD-10-CM

## 2021-09-29 PROBLEM — R51.9 HEADACHE: Status: ACTIVE | Noted: 2017-02-20

## 2021-09-29 PROBLEM — M41.125 ADOLESCENT IDIOPATHIC SCOLIOSIS OF THORACOLUMBAR REGION: Status: ACTIVE | Noted: 2018-07-11

## 2021-09-29 PROBLEM — L23.7 CONTACT DERMATITIS DUE TO POISON IVY: Status: ACTIVE | Noted: 2021-09-29
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
## 2021-09-29 PROBLEM — Q67.5 CONGENITAL POSTURAL SCOLIOSIS: Status: ACTIVE | Noted: 2021-09-29

## 2021-09-29 LAB
INFLUENZA A ANTIBODY: NORMAL
INFLUENZA B ANTIBODY: NORMAL
S PYO AG THROAT QL: NORMAL

## 2021-09-29 PROCEDURE — G8419 CALC BMI OUT NRM PARAM NOF/U: HCPCS | Performed by: FAMILY MEDICINE

## 2021-09-29 PROCEDURE — 1036F TOBACCO NON-USER: CPT | Performed by: FAMILY MEDICINE

## 2021-09-29 PROCEDURE — G8427 DOCREV CUR MEDS BY ELIG CLIN: HCPCS | Performed by: FAMILY MEDICINE

## 2021-09-29 PROCEDURE — 87804 INFLUENZA ASSAY W/OPTIC: CPT | Performed by: FAMILY MEDICINE

## 2021-09-29 PROCEDURE — 87880 STREP A ASSAY W/OPTIC: CPT | Performed by: FAMILY MEDICINE

## 2021-09-29 PROCEDURE — 99213 OFFICE O/P EST LOW 20 MIN: CPT | Performed by: FAMILY MEDICINE

## 2021-09-29 RX ORDER — CEFDINIR 300 MG/1
300 CAPSULE ORAL 2 TIMES DAILY
Qty: 14 CAPSULE | Refills: 0 | Status: SHIPPED | OUTPATIENT
Start: 2021-09-29 | End: 2021-10-06

## 2021-09-29 RX ORDER — METHYLPREDNISOLONE 4 MG/1
TABLET ORAL
Qty: 1 KIT | Refills: 0 | Status: SHIPPED
Start: 2021-09-29 | End: 2021-12-08

## 2021-09-29 ASSESSMENT — ENCOUNTER SYMPTOMS
SORE THROAT: 1
SINUS PAIN: 1
TROUBLE SWALLOWING: 1
RESPIRATORY NEGATIVE: 1
GASTROINTESTINAL NEGATIVE: 1
EYES NEGATIVE: 1
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
## 2022-01-05 DIAGNOSIS — Z34.83 MULTIGRAVIDA IN THIRD TRIMESTER: ICD-10-CM

## 2022-01-05 LAB
GLUCOSE TOLERANCE TEST 1 HOUR: 103 MG/DL
GLUCOSE TOLERANCE TEST 2 HOUR: 106 MG/DL
GLUCOSE TOLERANCE TEST FASTING: 75 MG/DL
HCT VFR BLD CALC: 35 % (ref 34–48)
HEMOGLOBIN: 11.5 G/DL (ref 11.5–15.5)
MCH RBC QN AUTO: 31.3 PG (ref 26–35)
MCHC RBC AUTO-ENTMCNC: 32.9 % (ref 32–34.5)
MCV RBC AUTO: 95.1 FL (ref 80–99.9)
PDW BLD-RTO: 12.9 FL (ref 11.5–15)
PLATELET # BLD: 160 E9/L (ref 130–450)
PMV BLD AUTO: 10.9 FL (ref 7–12)
RBC # BLD: 3.68 E12/L (ref 3.5–5.5)
WBC # BLD: 9.7 E9/L (ref 4.5–11.5)

## 2022-01-06 LAB — ANTIBODY SCREEN: NORMAL

## 2022-01-20 ENCOUNTER — OFFICE VISIT (OUTPATIENT)
Dept: FAMILY MEDICINE CLINIC | Age: 32
End: 2022-01-20
Payer: COMMERCIAL

## 2022-01-20 VITALS
HEART RATE: 78 BPM | SYSTOLIC BLOOD PRESSURE: 112 MMHG | RESPIRATION RATE: 18 BRPM | BODY MASS INDEX: 30.55 KG/M2 | OXYGEN SATURATION: 100 % | HEIGHT: 63 IN | WEIGHT: 172.4 LBS | DIASTOLIC BLOOD PRESSURE: 74 MMHG | TEMPERATURE: 98 F

## 2022-01-20 DIAGNOSIS — J06.9 UPPER RESPIRATORY INFECTION WITH COUGH AND CONGESTION: Primary | ICD-10-CM

## 2022-01-20 PROCEDURE — 99213 OFFICE O/P EST LOW 20 MIN: CPT | Performed by: NURSE PRACTITIONER

## 2022-01-20 PROCEDURE — 1036F TOBACCO NON-USER: CPT | Performed by: NURSE PRACTITIONER

## 2022-01-20 PROCEDURE — G8427 DOCREV CUR MEDS BY ELIG CLIN: HCPCS | Performed by: NURSE PRACTITIONER

## 2022-01-20 PROCEDURE — G8419 CALC BMI OUT NRM PARAM NOF/U: HCPCS | Performed by: NURSE PRACTITIONER

## 2022-01-20 PROCEDURE — G8484 FLU IMMUNIZE NO ADMIN: HCPCS | Performed by: NURSE PRACTITIONER

## 2022-01-20 RX ORDER — AZITHROMYCIN 250 MG/1
250 TABLET, FILM COATED ORAL SEE ADMIN INSTRUCTIONS
Qty: 6 TABLET | Refills: 0 | Status: SHIPPED | OUTPATIENT
Start: 2022-01-20 | End: 2022-01-25

## 2022-01-20 NOTE — PROGRESS NOTES
Chief Complaint:   Cough (onset x 4 days) and Chest Congestion    History of Present Illness   Source of history provided by:  patient. Any Pardo is a 32 y.o. old female who presents to walk-in with a cough for the past 6 days. States the cough is productive with clear sputum. She reports associated chest congestion and some mild SOB. Reports hx of walking pneumonia last year and this feels the same. She is asthmatic and has had to use her inhaler last night. Patient was recently on Augmentin and OTC Vitamins for sinus infection. Denies any fever, chills, N/V/D, abdominal pain, CP, progressive SOB, dizziness, or lethargy. She is currently 31 weeks pregnant. Follows with Dr. Jose Burroughs. Patient reports good fetal movement. Denies any loss of fluids or vaginal bleeding. Review of Systems    Unless otherwise stated in this report or unable to obtain because of the patient's clinical or mental status as evidenced by the medical record, this patients's positive and negative responses for Review of Systems, constitutional, psych, eyes, ENT, cardiovascular, respiratory, gastrointestinal, neurological, genitourinary, musculoskeletal, integument systems and systems related to the presenting problem are either stated in the preceding or were not pertinent or were negative for the symptoms and/or complaints related to the medical problem. Past Medical History:  has a past medical history of Asthma, Eczema, Environmental allergies, Left ovarian cyst, and Scoliosis. Past Surgical History:  has a past surgical history that includes Ovarian cyst surgery. Social History:  reports that she quit smoking about 8 years ago. Her smoking use included cigarettes. She smoked 0.50 packs per day. She has never used smokeless tobacco. She reports that she does not drink alcohol and does not use drugs. Family History: family history is not on file.    Allergies: Latex and Spearmint flavor    Physical Exam   Vital Signs: /74   Pulse 78   Temp 98 °F (36.7 °C)   Resp 18   Ht 5' 3\" (1.6 m)   Wt 172 lb 6.4 oz (78.2 kg)   LMP 07/08/2021   SpO2 100%   BMI 30.54 kg/m²    Oxygen Saturation Interpretation: Normal.    Constitutional:  Alert, development consistent with age. Ears:  External Ears: Normal bilateral pinna. TM's & External Canals: TM's normal bilaterally without perforation. Canals without erythema or drainage. Nose:   There is no obvious septal defect. Mouth:  Moist bucca mucosa and normal tongue. Throat: Mild posterior pharyngeal erythema without exudates or lesions. Neck:  Supple. There is no obvious adenopathy or neck tenderness. Lungs:   Breath sounds: Normal chest expansion and breath sounds noted throughout. No wheezes, rales, or rhonchi noted. Heart:  Regular rate and rhythm, normal heart sounds, without pathological murmurs, ectopy, gallops, or rubs. Abdomen:  Soft, nontender, good bowel sounds. No firm or pulsatile mass. Skin:  Normal turgor. Warm, dry, without visible rash. Neurological:  Oriented. Motor functions intact. Test Results Section   (All laboratory and radiology results have been personally reviewed by myself)  Labs:  No results found for this visit on 01/20/22. Imaging: All Radiology results interpreted by Radiologist unless otherwise noted. No results found. Assessment / Plan   Impression(s):  Flavio Mansfield was seen today for cough and chest congestion. Diagnoses and all orders for this visit:    Upper respiratory infection with cough and congestion  -     azithromycin (ZITHROMAX) 250 MG tablet; Take 1 tablet by mouth See Admin Instructions for 5 days 500mg on day 1 followed by 250mg on days 2 - 5    Prescription sent for Azithromycin, side effects discussed. Pt advised to f/u with PCP in 7-10 days for recheck. ER if changes or worse. Red flag symptoms discussed. Advised to take all medications as directed.  Patient verbalized understanding and agreeable to plan of care. All questions answered. Return if symptoms worsen or fail to improve. Electronically signed by JESSICA Kwon CNP   DD: 1/20/22    **This report was transcribed using voice recognition software. Every effort was made to ensure accuracy; however, inadvertent computerized transcription errors may be present.

## 2022-03-02 PROBLEM — O09.43 HIGH RISK MULTIGRAVIDA, THIRD TRIMESTER: Status: ACTIVE | Noted: 2022-03-02

## 2022-03-02 PROBLEM — D68.8 HEREDITARY THROMBOPHILIA (HCC): Status: ACTIVE | Noted: 2022-03-02

## 2022-03-02 PROBLEM — O09.291 HX OF PREECLAMPSIA, PRIOR PREGNANCY, CURRENTLY PREGNANT, FIRST TRIMESTER: Status: ACTIVE | Noted: 2022-03-02

## 2022-03-02 PROBLEM — D68.59 THROMBOPHILIA DURING PREGNANCY IN THIRD TRIMESTER (HCC): Status: ACTIVE | Noted: 2022-03-02

## 2022-03-02 PROBLEM — O99.113 THROMBOPHILIA DURING PREGNANCY IN THIRD TRIMESTER (HCC): Status: ACTIVE | Noted: 2022-03-02

## 2022-03-26 ENCOUNTER — HOSPITAL ENCOUNTER (INPATIENT)
Age: 32
LOS: 3 days | Discharge: HOME | DRG: 560 | End: 2022-03-29
Attending: OBSTETRICS & GYNECOLOGY | Admitting: OBSTETRICS & GYNECOLOGY
Payer: COMMERCIAL

## 2022-03-26 PROBLEM — O16.3 ELEVATED BLOOD PRESSURE AFFECTING PREGNANCY IN THIRD TRIMESTER, ANTEPARTUM: Status: ACTIVE | Noted: 2022-03-26

## 2022-03-26 LAB
ABO/RH: NORMAL
ALBUMIN SERPL-MCNC: 3.5 G/DL (ref 3.5–5.2)
ALP BLD-CCNC: 172 U/L (ref 35–104)
ALT SERPL-CCNC: 16 U/L (ref 0–32)
AMPHETAMINE SCREEN, URINE: NOT DETECTED
ANION GAP SERPL CALCULATED.3IONS-SCNC: 10 MMOL/L (ref 7–16)
ANTIBODY SCREEN: NORMAL
AST SERPL-CCNC: 22 U/L (ref 0–31)
BACTERIA: ABNORMAL /HPF
BARBITURATE SCREEN URINE: NOT DETECTED
BASOPHILS ABSOLUTE: 0.04 E9/L (ref 0–0.2)
BASOPHILS RELATIVE PERCENT: 0.6 % (ref 0–2)
BENZODIAZEPINE SCREEN, URINE: NOT DETECTED
BILIRUB SERPL-MCNC: <0.2 MG/DL (ref 0–1.2)
BILIRUBIN URINE: NEGATIVE
BLOOD, URINE: ABNORMAL
BUN BLDV-MCNC: 10 MG/DL (ref 6–20)
CALCIUM SERPL-MCNC: 8.9 MG/DL (ref 8.6–10.2)
CANNABINOID SCREEN URINE: NOT DETECTED
CHLORIDE BLD-SCNC: 104 MMOL/L (ref 98–107)
CLARITY: CLEAR
CO2: 22 MMOL/L (ref 22–29)
COCAINE METABOLITE SCREEN URINE: NOT DETECTED
COLOR: YELLOW
CREAT SERPL-MCNC: 0.6 MG/DL (ref 0.5–1)
CRYSTALS, UA: ABNORMAL /HPF
EOSINOPHILS ABSOLUTE: 0.06 E9/L (ref 0.05–0.5)
EOSINOPHILS RELATIVE PERCENT: 0.8 % (ref 0–6)
EPITHELIAL CELLS, UA: ABNORMAL /HPF
FENTANYL SCREEN, URINE: NOT DETECTED
GFR AFRICAN AMERICAN: >60
GFR NON-AFRICAN AMERICAN: >60 ML/MIN/1.73
GLUCOSE BLD-MCNC: 84 MG/DL (ref 74–99)
GLUCOSE URINE: NEGATIVE MG/DL
HCT VFR BLD CALC: 34.4 % (ref 34–48)
HEMOGLOBIN: 11.1 G/DL (ref 11.5–15.5)
IMMATURE GRANULOCYTES #: 0.11 E9/L
IMMATURE GRANULOCYTES %: 1.5 % (ref 0–5)
KETONES, URINE: NEGATIVE MG/DL
LEUKOCYTE ESTERASE, URINE: NEGATIVE
LYMPHOCYTES ABSOLUTE: 1.64 E9/L (ref 1.5–4)
LYMPHOCYTES RELATIVE PERCENT: 23 % (ref 20–42)
Lab: NORMAL
MCH RBC QN AUTO: 29.2 PG (ref 26–35)
MCHC RBC AUTO-ENTMCNC: 32.3 % (ref 32–34.5)
MCV RBC AUTO: 90.5 FL (ref 80–99.9)
METHADONE SCREEN, URINE: NOT DETECTED
MONOCYTES ABSOLUTE: 0.62 E9/L (ref 0.1–0.95)
MONOCYTES RELATIVE PERCENT: 8.7 % (ref 2–12)
NEUTROPHILS ABSOLUTE: 4.67 E9/L (ref 1.8–7.3)
NEUTROPHILS RELATIVE PERCENT: 65.4 % (ref 43–80)
NITRITE, URINE: NEGATIVE
OPIATE SCREEN URINE: NOT DETECTED
OXYCODONE URINE: NOT DETECTED
PDW BLD-RTO: 12.5 FL (ref 11.5–15)
PH UA: 6 (ref 5–9)
PHENCYCLIDINE SCREEN URINE: NOT DETECTED
PLATELET # BLD: 180 E9/L (ref 130–450)
PMV BLD AUTO: 11.7 FL (ref 7–12)
POTASSIUM SERPL-SCNC: 3.8 MMOL/L (ref 3.5–5)
PROTEIN UA: ABNORMAL MG/DL
RBC # BLD: 3.8 E12/L (ref 3.5–5.5)
RBC UA: ABNORMAL /HPF (ref 0–2)
SODIUM BLD-SCNC: 136 MMOL/L (ref 132–146)
SPECIFIC GRAVITY UA: >=1.03 (ref 1–1.03)
TOTAL PROTEIN: 6.5 G/DL (ref 6.4–8.3)
URIC ACID, SERUM: 4.5 MG/DL (ref 2.4–5.7)
UROBILINOGEN, URINE: 0.2 E.U./DL
WBC # BLD: 7.1 E9/L (ref 4.5–11.5)
WBC UA: ABNORMAL /HPF (ref 0–5)

## 2022-03-26 PROCEDURE — 84550 ASSAY OF BLOOD/URIC ACID: CPT

## 2022-03-26 PROCEDURE — 81001 URINALYSIS AUTO W/SCOPE: CPT

## 2022-03-26 PROCEDURE — 80053 COMPREHEN METABOLIC PANEL: CPT

## 2022-03-26 PROCEDURE — 2580000003 HC RX 258: Performed by: OBSTETRICS & GYNECOLOGY

## 2022-03-26 PROCEDURE — 80307 DRUG TEST PRSMV CHEM ANLYZR: CPT

## 2022-03-26 PROCEDURE — 86850 RBC ANTIBODY SCREEN: CPT

## 2022-03-26 PROCEDURE — 1220000001 HC SEMI PRIVATE L&D R&B

## 2022-03-26 PROCEDURE — 86900 BLOOD TYPING SEROLOGIC ABO: CPT

## 2022-03-26 PROCEDURE — 36415 COLL VENOUS BLD VENIPUNCTURE: CPT

## 2022-03-26 PROCEDURE — 86901 BLOOD TYPING SEROLOGIC RH(D): CPT

## 2022-03-26 PROCEDURE — 85025 COMPLETE CBC W/AUTO DIFF WBC: CPT

## 2022-03-26 PROCEDURE — 6360000002 HC RX W HCPCS: Performed by: OBSTETRICS & GYNECOLOGY

## 2022-03-26 PROCEDURE — 99221 1ST HOSP IP/OBS SF/LOW 40: CPT | Performed by: ADVANCED PRACTICE MIDWIFE

## 2022-03-26 RX ORDER — SODIUM CHLORIDE 9 MG/ML
25 INJECTION, SOLUTION INTRAVENOUS PRN
Status: DISCONTINUED | OUTPATIENT
Start: 2022-03-26 | End: 2022-03-30 | Stop reason: HOSPADM

## 2022-03-26 RX ORDER — MAGNESIUM SULFATE HEPTAHYDRATE 40 MG/ML
4000 INJECTION, SOLUTION INTRAVENOUS ONCE
Status: COMPLETED | OUTPATIENT
Start: 2022-03-26 | End: 2022-03-26

## 2022-03-26 RX ORDER — SODIUM CHLORIDE 0.9 % (FLUSH) 0.9 %
5-40 SYRINGE (ML) INJECTION EVERY 12 HOURS SCHEDULED
Status: DISCONTINUED | OUTPATIENT
Start: 2022-03-26 | End: 2022-03-30 | Stop reason: HOSPADM

## 2022-03-26 RX ORDER — CALCIUM GLUCONATE 94 MG/ML
1000 INJECTION, SOLUTION INTRAVENOUS PRN
Status: DISCONTINUED | OUTPATIENT
Start: 2022-03-26 | End: 2022-03-28

## 2022-03-26 RX ORDER — LABETALOL HYDROCHLORIDE 5 MG/ML
80 INJECTION, SOLUTION INTRAVENOUS
Status: ACTIVE | OUTPATIENT
Start: 2022-03-26 | End: 2022-03-26

## 2022-03-26 RX ORDER — LABETALOL HYDROCHLORIDE 5 MG/ML
40 INJECTION, SOLUTION INTRAVENOUS
Status: ACTIVE | OUTPATIENT
Start: 2022-03-26 | End: 2022-03-26

## 2022-03-26 RX ORDER — HYDRALAZINE HYDROCHLORIDE 20 MG/ML
10 INJECTION INTRAMUSCULAR; INTRAVENOUS
Status: ACTIVE | OUTPATIENT
Start: 2022-03-26 | End: 2022-03-26

## 2022-03-26 RX ORDER — LABETALOL HYDROCHLORIDE 5 MG/ML
20 INJECTION, SOLUTION INTRAVENOUS
Status: ACTIVE | OUTPATIENT
Start: 2022-03-26 | End: 2022-03-26

## 2022-03-26 RX ORDER — SODIUM CHLORIDE, SODIUM LACTATE, POTASSIUM CHLORIDE, CALCIUM CHLORIDE 600; 310; 30; 20 MG/100ML; MG/100ML; MG/100ML; MG/100ML
INJECTION, SOLUTION INTRAVENOUS CONTINUOUS
Status: DISCONTINUED | OUTPATIENT
Start: 2022-03-26 | End: 2022-03-30 | Stop reason: HOSPADM

## 2022-03-26 RX ORDER — SODIUM CHLORIDE 0.9 % (FLUSH) 0.9 %
5-40 SYRINGE (ML) INJECTION PRN
Status: DISCONTINUED | OUTPATIENT
Start: 2022-03-26 | End: 2022-03-30 | Stop reason: HOSPADM

## 2022-03-26 RX ADMIN — MAGNESIUM SULFATE IN WATER 4000 MG: 40 INJECTION, SOLUTION INTRAVENOUS at 22:39

## 2022-03-26 RX ADMIN — SODIUM CHLORIDE, POTASSIUM CHLORIDE, SODIUM LACTATE AND CALCIUM CHLORIDE: 600; 310; 30; 20 INJECTION, SOLUTION INTRAVENOUS at 21:32

## 2022-03-27 ENCOUNTER — ANESTHESIA (OUTPATIENT)
Dept: LABOR AND DELIVERY | Age: 32
DRG: 560 | End: 2022-03-27
Payer: COMMERCIAL

## 2022-03-27 ENCOUNTER — ANESTHESIA EVENT (OUTPATIENT)
Dept: LABOR AND DELIVERY | Age: 32
DRG: 560 | End: 2022-03-27
Payer: COMMERCIAL

## 2022-03-27 PROCEDURE — 3E033VJ INTRODUCTION OF OTHER HORMONE INTO PERIPHERAL VEIN, PERCUTANEOUS APPROACH: ICD-10-PCS | Performed by: OBSTETRICS & GYNECOLOGY

## 2022-03-27 PROCEDURE — 7200000001 HC VAGINAL DELIVERY

## 2022-03-27 PROCEDURE — 2500000003 HC RX 250 WO HCPCS

## 2022-03-27 PROCEDURE — 2580000003 HC RX 258: Performed by: OBSTETRICS & GYNECOLOGY

## 2022-03-27 PROCEDURE — 10907ZC DRAINAGE OF AMNIOTIC FLUID, THERAPEUTIC FROM PRODUCTS OF CONCEPTION, VIA NATURAL OR ARTIFICIAL OPENING: ICD-10-PCS | Performed by: OBSTETRICS & GYNECOLOGY

## 2022-03-27 PROCEDURE — 2500000003 HC RX 250 WO HCPCS: Performed by: ANESTHESIOLOGY

## 2022-03-27 PROCEDURE — 6370000000 HC RX 637 (ALT 250 FOR IP): Performed by: OBSTETRICS & GYNECOLOGY

## 2022-03-27 PROCEDURE — 1220000001 HC SEMI PRIVATE L&D R&B

## 2022-03-27 PROCEDURE — 0KQM0ZZ REPAIR PERINEUM MUSCLE, OPEN APPROACH: ICD-10-PCS | Performed by: OBSTETRICS & GYNECOLOGY

## 2022-03-27 PROCEDURE — 6360000002 HC RX W HCPCS: Performed by: OBSTETRICS & GYNECOLOGY

## 2022-03-27 PROCEDURE — 3700000025 EPIDURAL BLOCK: Performed by: ANESTHESIOLOGY

## 2022-03-27 RX ORDER — ONDANSETRON 2 MG/ML
4 INJECTION INTRAMUSCULAR; INTRAVENOUS EVERY 6 HOURS PRN
Status: DISCONTINUED | OUTPATIENT
Start: 2022-03-27 | End: 2022-03-27

## 2022-03-27 RX ORDER — MODIFIED LANOLIN
OINTMENT (GRAM) TOPICAL PRN
Status: DISCONTINUED | OUTPATIENT
Start: 2022-03-27 | End: 2022-03-30 | Stop reason: HOSPADM

## 2022-03-27 RX ORDER — ACETAMINOPHEN 650 MG
TABLET, EXTENDED RELEASE ORAL
Status: COMPLETED
Start: 2022-03-27 | End: 2022-03-27

## 2022-03-27 RX ORDER — DOCUSATE SODIUM 100 MG/1
100 CAPSULE, LIQUID FILLED ORAL 2 TIMES DAILY
Status: DISCONTINUED | OUTPATIENT
Start: 2022-03-27 | End: 2022-03-30 | Stop reason: HOSPADM

## 2022-03-27 RX ORDER — NALBUPHINE HCL 10 MG/ML
5 AMPUL (ML) INJECTION EVERY 4 HOURS PRN
Status: DISCONTINUED | OUTPATIENT
Start: 2022-03-27 | End: 2022-03-27

## 2022-03-27 RX ORDER — NALOXONE HYDROCHLORIDE 0.4 MG/ML
0.4 INJECTION, SOLUTION INTRAMUSCULAR; INTRAVENOUS; SUBCUTANEOUS PRN
Status: DISCONTINUED | OUTPATIENT
Start: 2022-03-27 | End: 2022-03-27

## 2022-03-27 RX ORDER — ACETAMINOPHEN 325 MG/1
650 TABLET ORAL EVERY 4 HOURS PRN
Status: DISCONTINUED | OUTPATIENT
Start: 2022-03-27 | End: 2022-03-30 | Stop reason: HOSPADM

## 2022-03-27 RX ORDER — NIFEDIPINE 10 MG/1
20 CAPSULE ORAL
Status: COMPLETED | OUTPATIENT
Start: 2022-03-27 | End: 2022-03-29

## 2022-03-27 RX ORDER — LIDOCAINE HYDROCHLORIDE 10 MG/ML
INJECTION, SOLUTION INFILTRATION; PERINEURAL
Status: COMPLETED
Start: 2022-03-27 | End: 2022-03-27

## 2022-03-27 RX ORDER — BUPIVACAINE HYDROCHLORIDE 2.5 MG/ML
INJECTION, SOLUTION EPIDURAL; INFILTRATION; INTRACAUDAL
Status: DISCONTINUED
Start: 2022-03-27 | End: 2022-03-27

## 2022-03-27 RX ORDER — LABETALOL HYDROCHLORIDE 5 MG/ML
20 INJECTION, SOLUTION INTRAVENOUS
Status: ACTIVE | OUTPATIENT
Start: 2022-03-27 | End: 2022-03-27

## 2022-03-27 RX ORDER — NIFEDIPINE 10 MG/1
10 CAPSULE ORAL
Status: ACTIVE | OUTPATIENT
Start: 2022-03-27 | End: 2022-03-27

## 2022-03-27 RX ORDER — IBUPROFEN 600 MG/1
600 TABLET ORAL EVERY 6 HOURS PRN
Status: DISCONTINUED | OUTPATIENT
Start: 2022-03-27 | End: 2022-03-30 | Stop reason: HOSPADM

## 2022-03-27 RX ADMIN — MAGNESIUM SULFATE IN WATER 2000 MG/HR: 40 INJECTION, SOLUTION INTRAVENOUS at 17:27

## 2022-03-27 RX ADMIN — Medication: at 13:38

## 2022-03-27 RX ADMIN — LIDOCAINE HYDROCHLORIDE: 10 INJECTION, SOLUTION INFILTRATION; PERINEURAL at 13:40

## 2022-03-27 RX ADMIN — Medication 1 MILLI-UNITS/MIN: at 01:26

## 2022-03-27 RX ADMIN — Medication 15 ML/HR: at 09:15

## 2022-03-27 RX ADMIN — Medication 10 ML: at 09:07

## 2022-03-27 RX ADMIN — Medication 5 ML: at 09:13

## 2022-03-27 RX ADMIN — BUTORPHANOL TARTRATE 1 MG: 2 INJECTION, SOLUTION INTRAMUSCULAR; INTRAVENOUS at 04:31

## 2022-03-27 RX ADMIN — SODIUM CHLORIDE, POTASSIUM CHLORIDE, SODIUM LACTATE AND CALCIUM CHLORIDE: 600; 310; 30; 20 INJECTION, SOLUTION INTRAVENOUS at 19:20

## 2022-03-27 RX ADMIN — IBUPROFEN 600 MG: 600 TABLET ORAL at 23:52

## 2022-03-27 RX ADMIN — IBUPROFEN 600 MG: 600 TABLET ORAL at 17:54

## 2022-03-27 RX ADMIN — MAGNESIUM SULFATE IN WATER 2000 MG/HR: 40 INJECTION, SOLUTION INTRAVENOUS at 07:09

## 2022-03-27 RX ADMIN — SODIUM CHLORIDE, POTASSIUM CHLORIDE, SODIUM LACTATE AND CALCIUM CHLORIDE: 600; 310; 30; 20 INJECTION, SOLUTION INTRAVENOUS at 09:41

## 2022-03-27 RX ADMIN — Medication 166.7 ML: at 13:40

## 2022-03-27 ASSESSMENT — PAIN SCALES - GENERAL
PAINLEVEL_OUTOF10: 2
PAINLEVEL_OUTOF10: 3
PAINLEVEL_OUTOF10: 4
PAINLEVEL_OUTOF10: 6

## 2022-03-27 NOTE — PROGRESS NOTES
0845- Broken tracing, pt moving from supine to sitting high fowlers on edge of bed, heart tones appears in 70's, Dr. Nikki Carltong in to assess, Pt rolls back to R tilt and 's.  Initally paused for epidural placement and ok to proceed with reassuring FHT's

## 2022-03-27 NOTE — PROGRESS NOTES
NARCISO Rice @ bedside to assess. CRNA instructed to have pt lay semifowlers and will administer medication.

## 2022-03-27 NOTE — PROGRESS NOTES
@ 23 [phone] Dr. Nikolas Yates in to discuss AROM.  Pt agrees with POC.  @1126, AROM moderqate, clear 3cm per MD

## 2022-03-27 NOTE — PROGRESS NOTES
Call to CRNA asking if she can come to bs to redose epidural. Pt did hit PCA button and tried to reposition. D/t pt's scoliosis, pt was informed prior to epidural placement by CRNA that possibility of epidural not working. Pt states and continues to state understanding. Pt is breathing through ctx. CRNA states she will come to bs.

## 2022-03-27 NOTE — PROGRESS NOTES
Call to Dr. Modesto Moody, updated pt is 3/80/-2, intact. No order to AROM at this time. Pitocin currently on 12mU, cat 1 tracing at this time.

## 2022-03-27 NOTE — PROGRESS NOTES
presents to labor and delivery stating she has preeclampsia complaints. Has not taken her blood pressure but feels like it is high. Reports slight headache and dizziness. Pelvic pain 4/10, irregular contractions. Denies LOF or VB. States FM has been lesser today than usual. BP elevated upon assessment; cycling. Placed on efm, call light within reach.

## 2022-03-27 NOTE — ANESTHESIA PROCEDURE NOTES
Epidural Block    Patient location during procedure: OB  Start time: 3/27/2022 8:51 AM  End time: 3/27/2022 9:15 AM  Reason for block: labor epidural  Staffing  Anesthesiologist: Wenceslao Baez MD  Resident/CRNA: Vivienne Bhat APRN - CRNA  Preanesthetic Checklist  Completed: patient identified, IV checked, site marked, risks and benefits discussed, surgical consent, monitors and equipment checked, pre-op evaluation, timeout performed, anesthesia consent given, oxygen available and patient being monitored  Epidural  Patient position: sitting  Prep: ChloraPrep  Patient monitoring: cardiac monitor, continuous pulse ox and frequent blood pressure checks  Approach: midline  Location: lumbar (1-5)  Injection technique: VENESSA air  Provider prep: mask and sterile gloves  Needle  Needle type: Tuohy   Needle gauge: 18 G  Needle length: 2.5 in  Needle insertion depth: 5 cm  Catheter type: end hole  Catheter size: 20 G.   Catheter at skin depth: 11 cm  Test dose: negative (0902)  Assessment  Sensory level: T10  Hemodynamics: stable  Attempts: 2
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
## 2022-03-27 NOTE — PROGRESS NOTES
Took infant over to MIU as mother on MgSo4, father of baby left to care for other 2 kids. Mother exhausted and was found to drowsy while holding infant. Mother states infant nursed x 30-45 min prior to taking over to [facility].

## 2022-03-27 NOTE — PROGRESS NOTES
Called to room 8 pt complaining lower perineal pain rating pain on scale 0-10 with at 8 with contractions and without contractions at 3 per pt. 1250 bp 149/93 hr 69 sat 98. Per rn pt 4-5 cm. Pt sitting at 30 degrees monitors on  Epidural catheter confirmed prior to bolus of bupivacaine 0.25 4cc at 1253. 138/82 hr 70 sat 99. 1257 4cc of bupivacaine 0.25%  bp 139/87 hr 79 oxygen sat 76 at 1300. Pt turned to left side and bolus 2cc. Pt checked 6cc at this time. Vss. Pt rating pain at 6 at this time.

## 2022-03-27 NOTE — PROGRESS NOTES
Notified Dr. Oswaldo Hernandez pt SVE 2/50/-2. States to begin pitocin at this time. Pt can have stadol 1 mg Q3H PRN for pain and is okay for an epidural whenever she would like. No further orders at this time.

## 2022-03-27 NOTE — L&D DELIVERY NOTE
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, LORENA-I 5G/4G hetero, Grayson Lav hetero)    Thrombophilia during pregnancy in third trimester     History of pre-eclampsia in prior pregnancy, currently pregnant, third trimester    Elevated blood pressure affecting pregnancy in third trimester, antepartum    40 3/7 weeks gestation of pregnancy    Post-term pregnancy, 40-42 weeks of gestation    Preeclampsia, severe, third trimester     Post-delivery Diagnosis:  Living  infant Male, second-degree perineal laceration  Patient Active Problem List   Diagnosis    Congenital postural scoliosis    High risk multigravida, third trimester    Hereditary thrombophilia (/129 cmpd hetero, LORENA-I 5G/4G hetero, Grayson Lav hetero)    Thrombophilia during pregnancy in third trimester     History of pre-eclampsia in prior pregnancy, currently pregnant, third trimester    Elevated blood pressure affecting pregnancy in third trimester, antepartum    40 3/7 weeks gestation of pregnancy    Post-term pregnancy, 40-42 weeks of gestation    Preeclampsia, severe, third trimester     (normal spontaneous vaginal delivery)    Term birth of  male   tar Second degree perineal laceration, delivered, current hospitalization       Information for the patient's :  Nakita Espino [[id number]]   Normal Spontaneous Vaginal delivery, uncomplicated     Delivering Clinician: Kaela Weathers Wt:   Information for the patient's :  Andie Valles [[id number]]   8 pounds 1 ounce  3650 g     APGARS:     Information for the patient's :  Andie Valles [[id number]]   1 minute: 8  5 minute: 9    Anesthesia:  epidural anesthesia; 1% lidocaine locally for repair    Application and Delivery:  32 y.o. W female [id number] high risk multigravid, postdates with thrombophilia and history of preeclampsia in prior pregnancy presented to labor delivery at 40w3d with headaches and blurred vision admitted with an initial blood pressure of 173/99 for magnesium sulfate and Pitocin induction who progressed to complete post amniotomy and delivered Cephalic, left occiput anterior presentation via  @ 1338, under epidural anesthesia anesthesia,  over an intact perineum with a resulting 2nd degree laceration, without dystocia or complication, a Live Born Male infant, weighing 8# 1oz, 3650 grams, Clear fluid at delivery, bulb suctioned on perineum. A nuchal cord was not present. A delayed cord clamping was performed. Spontaneous cry,  Apgar's 1 minute:  8; 5 minute:  9. The placenta was delivered with gentle traction and was noted to be intact, whole and that the umbilical cord had three vessels noted. Episiotomy was not needed due to a spontaneous small 2nd degree (subcutaneous tissue involved) vaginal/perineal laceration. Repair performed in layers, per routine with  Vicryl 3.0 suture. Cervix, rectum, sphincter intact. Sponge, needle, and instrument counts correct x 2.     Delivery Summary:  Mother's Information    Labor Events     Labor?: No  Cervical Ripening: n/a  Dior Fung [[id number]]    Labor Events     Labor?: No   Steroids?: None  Cervical Ripening Date/Time: n/a    Antibiotics Received during Labor?: No  Rupture Identifier: Sac 1   Rupture Date/Time: 3/27/22 11:26:00   Rupture Type: AROM  Fluid Color: Clear  Fluid Odor: None  Fluid Volume: Moderate  Induction: Oxytocin  Labor Complications: Pre-eclampsia     Anesthesia    Method: Epidural     Start Pushing    Labor onset date/time: 3/27/22 09:45:00 Now   Dilation complete date/time: 3/27/22 13:35:00 Now   Start pushing date/time: 3/27/2022 13:35:00      Labor Length    1st stage: 3h 50m  2nd stage: 0h 04m  3rd stage: 0h 06m     Delivery (Gre[place])    Delivery Date/Time:  3/27/22 13:39:00   Delivery Type: Vaginal, Spontaneous     [place] Presentation    Presentation: Vertex  Position: Left  _: Occiput  _: Anterior     Shoulder Dystocia    Shoulder Dystocia Present?: No     Assisted Delivery Details    Forceps Attempted?: No  Vacuum Extractor Attempted?: No     Cord    Vessels: 3 Vessels  Complications: None  Delayed Cord Clamping?: Yes  Cord Clamped Date/Time: 3/27/2022 13:40:00  Cord Blood Disposition: Refrigerator  Gases Sent?: Yes     Placenta    Date/Time: 3/27/2022 13:45:00  Removal: Spontaneous  Appearance: Intact  Disposition: Placenta Refrigerator     Lacerations    Episiotomy: None  Perineal Lacerations: 2nd  Other Lacerations: no non-perineal laceration  Number of Repair Packets: 1     Vaginal Counts    Initial Count Personnel: Azeem Lam RN  Initial Count Verified By: Nafisa Saldivar    Sponges Needles Instruments   Initial Counts Correct Correct Correct   Final Counts Correct Correct Correct   Final Count Personnel: DR. Nafisa Saldivar  Final Count Verified By: Azeem Lam RN  Accurate Final Count?: Yes  If the count is incorrect due to Intentionally Retained Foreign Object (IRFO) add the IRFO LDA in Lines/Drains.   Add LDA: Link to [facility]     Blood Loss  Mother: Juanita Free" #[id number]   Start of Mother's Information    Delivery Blood Loss  22 0945 - 22 0806    Blood Hospital Encounter 300 mL    Total  300      End of Mother's Information  Mother: Juanita Free" #[id number]       Delivery Providers Delivering clinician: Orbie Apgar, MD     Provider Role     Obstetrician    Madisyn Cheng, RN Primary Nurse    Merary Michael RN   Primary [place] Nurse       [place] Assessment    Living Status: Living  Delivery Location: L&D     Apgar Scoring Key:    0 1 2    Skin Color: Blue or pale Acrocyanotic Completely pink    Heart Rate: Absent <100 bpm >100 bpm    Reflex Irritability: No response Grimace Cry or active withdrawal    Muscle Tone: Limp Some flexion Active motion    Respiratory Effort: Absent Weak cry; hypoventilation Good, crying                  Skin Color:   Heart Rate:   Reflex Irritability:   Muscle Tone:   Respiratory Effort: Total:            1 Minute:    0    2    2    2    2    8        Apgar 1 total from OB History    5 Minute:    1    2    2    2    2    9        Apgar 5 total from OB History    10 Minute:              15 Minute:              20 Minute:                      Apgars Assigned By: SHARON OLMSTEAD RN          Resuscitation    Method: Bulb Suction            Measurements    Birth Weight: 3650 g Birth Length: 0.521 m   Head Circumference: 0.35 m           Title    Skin to Skin Initiation Date/Time:     Skin to Skin End Date/Time:     Reason Skin to Skin Not Initiated: Patient Refused              Specimen:  Nonn     Estimated blood loss: 300 mL    Condition:  infant stable to general nursery and mother stable to maternity    Blood Type and Rh: A POS    Rubella Immunity Status:   Immune           Infant Feeding:    breast    Attending Attestation: I performed the procedure.     Orbie Apgar, MD MD, Stephon Crandall  3/27/2022 2:15 PM

## 2022-03-27 NOTE — H&P
CHIEF COMPLAINT:  contractions, elevated blood pressure, headache, blurred vision, some contractions. No lof or vb. Baby moving well    HISTORY OF PRESENT ILLNESS:      The patient is a 32 y.o. female at 41w4d.   OB History        4    Para   2    Term   2            AB   1    Living   2       SAB        IAB   1    Ectopic        Molar        Multiple   0    Live Births   2            Patient presents with a chief complaint as above and is being admitted for gestational hypertension    Estimated Due Date: Estimated Date of Delivery: 3/24/22    PRENATAL CARE:    Complicated by: preeclampsia/eclampsia, scoliosis and clotting disorder    PAST OB HISTORY  OB History        4    Para   2    Term   2            AB   1    Living   2       SAB        IAB   1    Ectopic        Molar        Multiple   0    Live Births   2                Past Medical History:        Diagnosis Date    Asthma     Exercise Induced    Eczema     Environmental allergies     Hypertension     preeclampsia prev pregnancy    Left ovarian cyst     Scoliosis      Past Surgical History:        Procedure Laterality Date    OVARIAN CYST SURGERY       Allergies:  Latex and Spearmint flavor  Social History:    Social History     Socioeconomic History    Marital status: Single     Spouse name: Not on file    Number of children: Not on file    Years of education: Not on file    Highest education level: Not on file   Occupational History    Not on file   Tobacco Use    Smoking status: Never Smoker    Smokeless tobacco: Never Used   Vaping Use    Vaping Use: Never used   Substance and Sexual Activity    Alcohol use: Not Currently     Comment: socially    Drug use: No    Sexual activity: Yes     Partners: Male   Other Topics Concern    Not on file   Social History Narrative    Not on file     Social Determinants of Health     Financial Resource Strain:     Difficulty of Paying Living Expenses: Not on file Food Insecurity:     Worried About Running Out of Food in the Last Year: Not on file    Jhonathan of Food in the Last Year: Not on file   Transportation Needs:     Lack of Transportation (Medical): Not on file    Lack of Transportation (Non-Medical): Not on file   Physical Activity:     Days of Exercise per Week: Not on file    Minutes of Exercise per Session: Not on file   Stress:     Feeling of Stress : Not on file   Social Connections:     Frequency of Communication with Friends and Family: Not on file    Frequency of Social Gatherings with Friends and Family: Not on file    Attends [employer] Services: Not on file    Active Member of [address] [place] [address] [employer] or Organizations: Not on file    Attends Club or Organization Meetings: Not on file    Marital Status: Not on file   Intimate Partner Violence:     Fear of Current or Ex-Partner: Not on file    Emotionally Abused: Not on file    Physically Abused: Not on file    Sexually Abused: Not on file   Housing Stability:     Unable to Pay for Housing in the Last Year: Not on file    Number of [place] in the Last Year: Not on file    Unstable Housing in the Last Year: Not on file     Family History:   History reviewed. No pertinent family history.   Medications Prior to Admission:  Medications Prior to Admission: calcium carbonate (OSCAL) 500 MG TABS tablet, Take 500 mg by mouth daily  magnesium 30 MG tablet, Take 30 mg by mouth 2 times daily  zinc gluconate 50 MG tablet, Take 50 mg by mouth daily  Cholecalciferol (VITAMIN D3) 1.25 MG (60934 UT) CAPS, Take by mouth  aspirin (ASPIRIN CHILDRENS) 81 MG chewable tablet, Take 1 tablet by mouth daily  folic acid (FOLVITE) 1 MG tablet, Take 2 tablets by mouth daily  Cyanocobalamin (B-12) 100 MCG TABS, Take 100 mcg by mouth daily  pyridoxine (B-6) 25 MG tablet, Take 1 tablet by mouth daily  Prenatal MV-Min-Fe Fum-FA-DHA (PRENATAL 1 PO), Take by mouth    REVIEW OF SYSTEMS:    CONSTITUTIONAL:  negative  RESPIRATORY:

## 2022-03-27 NOTE — PROGRESS NOTES
Notified Dr. Veronica Acosta of pt labs. States to begin magnesium, recheck cervix @ 0100 and call with assessment.

## 2022-03-28 PROBLEM — D50.9 IRON DEFICIENCY ANEMIA OF PREGNANCY: Status: ACTIVE | Noted: 2022-03-28

## 2022-03-28 PROBLEM — O48.0 POST-TERM PREGNANCY, 40-42 WEEKS OF GESTATION: Status: ACTIVE | Noted: 2022-03-27

## 2022-03-28 PROBLEM — O09.293 HISTORY OF PRE-ECLAMPSIA IN PRIOR PREGNANCY, CURRENTLY PREGNANT, THIRD TRIMESTER: Status: ACTIVE | Noted: 2022-03-02

## 2022-03-28 PROBLEM — O99.019 IRON DEFICIENCY ANEMIA OF PREGNANCY: Status: ACTIVE | Noted: 2022-03-28

## 2022-03-28 PROBLEM — M41.125 ADOLESCENT IDIOPATHIC SCOLIOSIS OF THORACOLUMBAR REGION: Status: RESOLVED | Noted: 2018-07-11 | Resolved: 2022-03-28

## 2022-03-28 PROBLEM — O14.13 PREECLAMPSIA, SEVERE, THIRD TRIMESTER: Status: ACTIVE | Noted: 2022-03-28
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
## 2022-03-28 PROBLEM — L23.7 CONTACT DERMATITIS DUE TO POISON IVY: Status: RESOLVED | Noted: 2021-09-29 | Resolved: 2022-03-28

## 2022-03-28 PROBLEM — R51.9 HEADACHE: Status: RESOLVED | Noted: 2017-02-20 | Resolved: 2022-03-28

## 2022-03-28 PROCEDURE — 6370000000 HC RX 637 (ALT 250 FOR IP): Performed by: OBSTETRICS & GYNECOLOGY

## 2022-03-28 PROCEDURE — 1220000000 HC SEMI PRIVATE OB R&B

## 2022-03-28 PROCEDURE — 2580000003 HC RX 258: Performed by: OBSTETRICS & GYNECOLOGY

## 2022-03-28 RX ADMIN — SODIUM CHLORIDE, PRESERVATIVE FREE 10 ML: 5 INJECTION INTRAVENOUS at 20:25

## 2022-03-28 RX ADMIN — IBUPROFEN 600 MG: 600 TABLET ORAL at 23:49

## 2022-03-28 RX ADMIN — IBUPROFEN 600 MG: 600 TABLET ORAL at 08:08

## 2022-03-28 RX ADMIN — Medication: at 11:37

## 2022-03-28 RX ADMIN — DOCUSATE SODIUM 100 MG: 100 CAPSULE, LIQUID FILLED ORAL at 08:08

## 2022-03-28 RX ADMIN — NIFEDIPINE 10 MG: 10 CAPSULE, LIQUID FILLED ORAL at 05:49

## 2022-03-28 RX ADMIN — DOCUSATE SODIUM 100 MG: 100 CAPSULE, LIQUID FILLED ORAL at 20:24

## 2022-03-28 RX ADMIN — ACETAMINOPHEN 650 MG: 325 TABLET ORAL at 20:24

## 2022-03-28 RX ADMIN — IBUPROFEN 600 MG: 600 TABLET ORAL at 17:18

## 2022-03-28 ASSESSMENT — PAIN SCALES - GENERAL
PAINLEVEL_OUTOF10: 3
PAINLEVEL_OUTOF10: 2
PAINLEVEL_OUTOF10: 3
PAINLEVEL_OUTOF10: 1

## 2022-03-28 NOTE — PLAN OF CARE
Problem: Anxiety:  Goal: Level of anxiety will decrease  Description: Level of anxiety will decrease  Outcome: Completed     Problem: Breathing Pattern - Ineffective:  Goal: Able to breathe comfortably  Description: Able to breathe comfortably  Outcome: Completed     Problem: Fluid Volume - Imbalance:  Goal: Absence of imbalanced fluid volume signs and symptoms  Description: Absence of imbalanced fluid volume signs and symptoms  Outcome: Completed  Goal: Absence of intrapartum hemorrhage signs and symptoms  Description: Absence of intrapartum hemorrhage signs and symptoms  Outcome: Completed     Problem: Infection - Intrapartum Infection:  Goal: Will show no infection signs and symptoms  Description: Will show no infection signs and symptoms  Outcome: Completed     Problem: Labor Process - Prolonged:  Goal: Labor progression, first stage, within specified pattern  Description: Labor progression, first stage, within specified pattern  Outcome: Completed  Goal: Labor progession, second stage, within specified pattern  Description: Labor progession, second stage, within specified pattern  Outcome: Completed  Goal: Uterine contractions within specified parameters  Description: Uterine contractions within specified parameters  Outcome: Completed     Problem:  Screening:  Goal: Ability to make informed decisions regarding treatment has improved  Description: Ability to make informed decisions regarding treatment has improved  Outcome: Completed     Problem: Pain - Acute:  Goal: Pain level will decrease  Description: Pain level will decrease  Outcome: Completed  Goal: Able to cope with pain  Description: Able to cope with pain  Outcome: Completed     Problem: Tissue Perfusion - Uteroplacental, Altered:  Goal: Absence of abnormal fetal heart rate pattern  Description: Absence of abnormal fetal heart rate pattern  Outcome: Completed     Problem: Urinary Retention:  Goal: Experiences of bladder distention will decrease  Description: Experiences of bladder distention will decrease  Outcome: Completed  Goal: Urinary elimination within specified parameters  Description: Urinary elimination within specified parameters  Outcome: Completed     Problem: Pain:  Goal: Pain level will decrease  Description: Pain level will decrease  Outcome: Completed  Goal: Control of acute pain  Description: Control of acute pain  Outcome: Completed  Goal: Control of chronic pain  Description: Control of chronic pain  Outcome: Completed     Problem: Fluid Volume - Imbalance:  Goal: Absence of imbalanced fluid volume signs and symptoms  Description: Absence of imbalanced fluid volume signs and symptoms  Outcome: Met This Shift  Goal: Absence of postpartum hemorrhage signs and symptoms  Description: Absence of postpartum hemorrhage signs and symptoms  Outcome: Met This Shift     Problem: Infection - Risk of, Puerperal Infection:  Goal: Will show no infection signs and symptoms  Description: Will show no infection signs and symptoms  Outcome: Met This Shift     Problem: Mood - Altered:  Goal: Mood stable  Description: Mood stable  Outcome: Met This Shift     Problem: Pain - Acute:  Goal: Pain level will decrease  Description: Pain level will decrease  Outcome: Met This Shift

## 2022-03-28 NOTE — PROGRESS NOTES
[facility] screening results were discussed with parent. Questions answered. Brochure given to parent. Advised to monitor developmental milestones and contact physician for any concerns.    Kerline Nogueira

## 2022-03-28 NOTE — DISCHARGE INSTR - DIET

## 2022-03-28 NOTE — DISCHARGE INSTR - ACTIVITY
After Your Delivery Discharge Instructions    What to do after you leave the hospital:    Recommended diet: regular diet  Recommended activity: No driving for 1 weeks, no sex or tampon use for 6 weeks. No douching. No heavy lifting (greater than baby and carrier) for 6 weeks. Initially, avoid frequent ambulation with stairs - start slowly then increase as tolerated. You may return to work in 6 weeks. Showers are fine - avoid bath tubs, hot tubs, swimming. Follow-up in 1 week for blood pressure check. Recheck blood pressures in 3 weeks time. Continue blood pressure medications for 3 weeks, then stop 24 to 48 hours prior to that visit for blood pressure check. Follow-up again in 6 weeks for final postpartum visit. Call the Physician with any of these signs and symptoms:    Warning signs regarding stitches:  \"Popping\" of stitches  Foul smelling discharge or pus  More redness or streaks around stitches than before    Perineum / episiotomy care:  Continue care as in the hospital (sitz baths, squirt bottle, etc.)  No tub baths until OK'd by your Physician , showers are fine     After your delivery - signs and symptoms to watch for:  Fever - Oral temperature greater than 100.4 degrees Fahrenheit  Foul-smelling vaginal discharge  Headache unrelieved by \"pain meds\", visual changes  Difficulty urinating  Breasts reddened, hard, hot to the touch  Nipple discharge which is foul-smelling or contains pus  Increased pain at the site of the laceration repair  Difficulty breathing with or without chest pain  New calf pain especially if only on one side  Sudden, continuing increased vaginal bleeding (heavier than a period) with or without clots  Unrelieved feelings of:   Inability to cope  Sadness  Anxiety  Lack of interest in baby  Insomnia  Crying     What to do at home:  Resume activity gradually   Don't lift anything heavier than baby and carrier until OK'd by your Physician   No sex until OK'd by your Physician  Eat regular nutritious meals  Take pain medication as prescribed whenever you need them  To avoid/relieve constipation take stool softeners if advised   Increase fiber in your diet    Most importantly ENJOY your Baby!  - Dr. Wendy Sharma =)))    Please call immediately with Questions and Concerns - [phone] (Office) or [phone] (Answering Service) after hours and weekends. By signing below, I understand that if any emergent problems occur, once I leave the hospital, I am to dial #[phone] or go immediately to the nearest Emergency Room. For less emergent matters,  Dr. Jareth Lee can be reached by calling his Office or  answering service at the above numbers. I understand and acknowledge receipt of the instructions indicated above.

## 2022-03-28 NOTE — ANESTHESIA POSTPROCEDURE EVALUATION
Department of Anesthesiology  Postprocedure Note    Patient: Suleiman Chance  MRN: [id number]  YOB: 1990  Date of evaluation: 3/28/2022  Time:  9:32 AM     Procedure Summary     Date: 03/27/22 Room / Location:     Anesthesia Start: 0851 Anesthesia Stop: 7254    Procedure: Labor Analgesia Diagnosis:     Scheduled Providers:  Responsible Provider: Lizandro Cook MD    Anesthesia Type: general, spinal, epidural ASA Status: 3          Anesthesia Type: general, spinal, epidural    Kristopher Phase I: Kristopher Score: 10    Kristopher Phase II:      Last vitals: Reviewed and per EMR flowsheets. Anesthesia Post Evaluation    Patient location during evaluation: bedside  Patient participation: complete - patient participated  Level of consciousness: awake  Pain score: 0  Airway patency: patent  Nausea & Vomiting: no nausea and no vomiting  Complications: no  Cardiovascular status: hemodynamically stable  Respiratory status: acceptable  Hydration status: euvolemic  Comments: Patient states that due to her scoliosis, the epidural only worked on the right side. She states that this also happened with her previous epidural and she expected it with this epidural. Patient states she is satisfied.

## 2022-03-28 NOTE — LACTATION NOTE
Baby in NN, mother is experienced and denies concerns regarding breastfeeding with this baby. Encouraged skin to skin and frequent attempts at breast to stimulate milk production. Instructed on normal infant behavior in the first 12-24 hours and importance of stimulating the baby frequently to eat during this time. Reviewed hand expression, and encouraged to hand express drops of colostrum when baby is sleepy. Instructed that baby may also feed 8-12 times a day- cluster feeding at times- as her milk supply is being established. Instructed on benefits of skin to skin and avoidance of pacifier / artificial nipple use until breastfeeding is well established. Educated on making sure infant has an open airway while breastfeeding and skin to skin. Instructed on hunger cues and waking techniques to try. Reviewed signs of adequate I & O; allow baby to feed ad aida and not to limit time at breast. Breastfeeding booklet provided with review of its contents. Encouraged to call with any concerns. Pt has personal EBP for home use.

## 2022-03-28 NOTE — FLOWSHEET NOTE
Pt assisted to bathroom, voided. Instructed on ethan care. Pt back to bed. Pt to call for further assistance to bathroom if needed.

## 2022-03-28 NOTE — PROGRESS NOTES
PPD #1    Patient:  Rupa Weathers     Admit Date:  3/26/2022  9:09 PM  Medical Record Number:  [id number]   Today's Date: 3/28/2022    Patient has been doing well postpartum with blood pressures in the 130s/70-80 range and diuresing well, as a result magnesium discontinued at 12 hours and patient transferred to the floor. S: No complaints -according to the patient, baby had just choked prior to her blood pressure elevation this AM which caused it to be up; tolerating diet: yes -general; ambulating well: yes -up in room and in halls; voiding without difficulty:  yes -has no urinary complaints; bm: denies; flatus: yes -normal; pain meds appropriate: yes -ibuprofen; vaginal bleeding: Less than a period this morning. O:   Vitals:    03/28/22 0120 03/28/22 0215 03/28/22 0530 03/28/22 0812   BP: 135/76 (!) 144/84 (!) 185/88 131/78   Pulse: 71 60 64 80   Resp: 12 16 16 18   Temp:  98.3 °F (36.8 °C) 98.2 °F (36.8 °C) 98.1 °F (36.7 °C)   TempSrc:  Oral Oral Oral   SpO2: 98% 98%  97%   Weight:       Height:         Date 03/28/22 0000 - 03/28/22 2359   Shift 0709-4105 7079-6819 7281-4222 24 Hour Total   INTAKE   Shift Total(mL/kg)       OUTPUT   Urine(mL/kg/hr) 610(0.9)   610   Shift Total(mL/kg) 610(7.2)   610(7.2)   Weight (kg) 84.4 84.4 84.4 84.4       I/O last 3 completed shifts:   In: 4375.9 [I.V.:4275.7; IV Piggyback:100.2]  Out: 1873 [Urine:7310; Blood:300]    Gen: A&O, cooperative, pleasant   Heart: RRR   Lungs: CTA b/l   Abd; soft, NT, non distended, +BS  Back: no CVA or paraspinal tenderness   Ext: No clubbing, cyanosis, edema:trace -no pitting, varicosities present with no cords palpable, no calf tenderness   Neuro: intact   Uterus: firm, well contracted, nt   Perineum: intact, healing well   Kirti pad: staining only, thin lochia    Lab Results   Component Value Date    HGB 11.1 (L) 03/26/2022    HCT 34.4 03/26/2022      Recent Results (from the past 72 hour(s))   CBC with Auto Differential    Collection Time: mg/dL    Specific Gravity, UA >=1.030 1.005 - 1.030    Blood, Urine TRACE-INTACT Negative    pH, UA 6.0 5.0 - 9.0    Protein, UA TRACE Negative mg/dL    Urobilinogen, Urine 0.2 <2.0 E.U./dL    Nitrite, Urine Negative Negative    Leukocyte Esterase, Urine Negative Negative    WBC, UA 2-5 0 - 5 /HPF    RBC, UA 1-3 0 - 2 /HPF    Epithelial Cells, UA MODERATE /HPF    Bacteria, UA MODERATE (A) None Seen /HPF    Crystals, UA Many (A) None Seen /HPF   Urine Drug Screen    Collection Time: 22  9:40 PM   Result Value Ref Range    Amphetamine Screen, Urine NOT DETECTED Negative <1000 ng/mL    Barbiturate Screen, Ur NOT DETECTED Negative < 200 ng/mL    Benzodiazepine Screen, Urine NOT DETECTED Negative < 200 ng/mL    Cannabinoid Scrn, Ur NOT DETECTED Negative < 50ng/mL    Cocaine Metabolite Screen, Urine NOT DETECTED Negative < 300 ng/mL    Opiate Scrn, Ur NOT DETECTED Negative < 300ng/mL    PCP Screen, Urine NOT DETECTED Negative < 25 ng/mL    Methadone Screen, Urine NOT DETECTED Negative <300 ng/mL    Oxycodone Urine NOT DETECTED Negative <100 ng/mL    FENTANYL SCREEN, URINE NOT DETECTED Negative <1 ng/mL    Drug Screen Comment: see below        A: 32 y.o. female [id number] at 40w3d weeks  PPD #1 S/P ; Episiotomy was not needed due to a spontaneous 2nd degree (subcutaneous tissue involved) vaginal laceration  Preeclampsia with severe features -blood pressures improved postpartum on magnesium with excellent diuresis, magnesium discontinued 12 hours  Blood pressure spike at 0530 -185/88 status post 20 mg nifedipine -with repeat 131/78  Iron deficiency anemia pregnancy  Hereditary thrombophilia  Patient Active Problem List   Diagnosis    Congenital postural scoliosis    High risk multigravida, third trimester    Hereditary thrombophilia (677/1298 cmpd hetero, LORENA-I 5G/4G hetero, Factoe XIII V34L hetero)    Thrombophilia during pregnancy in third trimester     History of pre-eclampsia in prior pregnancy, currently pregnant, third trimester    Elevated blood pressure affecting pregnancy in third trimester, antepartum    40 3/7 weeks gestation of pregnancy    Post-term pregnancy, 40-42 weeks of gestation    Preeclampsia, severe, third trimester     (normal spontaneous vaginal delivery)    Term birth of  male   Landy Barrientos Second degree perineal laceration, delivered, current hospitalization    Iron deficiency anemia of pregnancy       P: Routine PP care. Continue ibuprofen 600 mg as needed for pain. Resume prenatal vitamins with iron daily. Nifedipine for systolic blood pressures greater than equal to 682 or diastolic blood pressure greater equal to 110. Home tomorrow is anticipated.     Stephen Gatica MD FACOG  3/28/2022 8:24 AM

## 2022-03-28 NOTE — PROGRESS NOTES
Uneventful evening. Ambulating, voiding, vaginal bleeding RNA. Satisfied with pain control. Comfortable.

## 2022-03-29 VITALS
HEIGHT: 63 IN | TEMPERATURE: 98 F | OXYGEN SATURATION: 97 % | RESPIRATION RATE: 16 BRPM | DIASTOLIC BLOOD PRESSURE: 90 MMHG | HEART RATE: 93 BPM | BODY MASS INDEX: 32.96 KG/M2 | SYSTOLIC BLOOD PRESSURE: 140 MMHG | WEIGHT: 186 LBS

## 2022-03-29 PROBLEM — O99.113 THROMBOPHILIA DURING PREGNANCY IN THIRD TRIMESTER (HCC): Status: RESOLVED | Noted: 2022-03-02 | Resolved: 2022-03-29

## 2022-03-29 PROBLEM — O09.293 HISTORY OF PRE-ECLAMPSIA IN PRIOR PREGNANCY, CURRENTLY PREGNANT, THIRD TRIMESTER: Status: RESOLVED | Noted: 2022-03-02 | Resolved: 2022-03-29

## 2022-03-29 PROBLEM — O14.13 PREECLAMPSIA, SEVERE, THIRD TRIMESTER: Status: RESOLVED | Noted: 2022-03-28 | Resolved: 2022-03-29

## 2022-03-29 PROBLEM — D68.59 THROMBOPHILIA DURING PREGNANCY IN THIRD TRIMESTER (HCC): Status: RESOLVED | Noted: 2022-03-02 | Resolved: 2022-03-29

## 2022-03-29 PROBLEM — O48.0 POST-TERM PREGNANCY, 40-42 WEEKS OF GESTATION: Status: RESOLVED | Noted: 2022-03-27 | Resolved: 2022-03-29

## 2022-03-29 PROBLEM — O09.43 HIGH RISK MULTIGRAVIDA, THIRD TRIMESTER: Status: RESOLVED | Noted: 2022-03-02 | Resolved: 2022-03-29

## 2022-03-29 PROCEDURE — 6370000000 HC RX 637 (ALT 250 FOR IP): Performed by: OBSTETRICS & GYNECOLOGY

## 2022-03-29 PROCEDURE — 1220000000 HC SEMI PRIVATE OB R&B

## 2022-03-29 RX ORDER — DOCUSATE SODIUM 100 MG/1
100 CAPSULE, LIQUID FILLED ORAL 2 TIMES DAILY PRN
COMMUNITY
Start: 2022-03-29 | End: 2022-09-19

## 2022-03-29 RX ORDER — MODIFIED LANOLIN
1 OINTMENT (GRAM) TOPICAL PRN
COMMUNITY
Start: 2022-03-29 | End: 2022-09-19

## 2022-03-29 RX ORDER — NIFEDIPINE 60 MG/1
60 TABLET, EXTENDED RELEASE ORAL DAILY
Qty: 30 TABLET | Refills: 1 | Status: SHIPPED | OUTPATIENT
Start: 2022-03-29 | End: 2022-09-19

## 2022-03-29 RX ORDER — MAGNESIUM 30 MG
30 TABLET ORAL DAILY
Refills: 0 | COMMUNITY
Start: 2022-03-29 | End: 2022-09-19

## 2022-03-29 RX ORDER — IBUPROFEN 600 MG/1
600 TABLET ORAL EVERY 6 HOURS PRN
Qty: 30 TABLET | Refills: 3 | Status: SHIPPED | OUTPATIENT
Start: 2022-03-29 | End: 2022-09-19

## 2022-03-29 RX ORDER — NIFEDIPINE 30 MG/1
30 TABLET, FILM COATED, EXTENDED RELEASE ORAL DAILY
Status: DISCONTINUED | OUTPATIENT
Start: 2022-03-29 | End: 2022-03-30 | Stop reason: HOSPADM

## 2022-03-29 RX ADMIN — DOCUSATE SODIUM 100 MG: 100 CAPSULE, LIQUID FILLED ORAL at 07:52

## 2022-03-29 RX ADMIN — NIFEDIPINE 10 MG: 10 CAPSULE, LIQUID FILLED ORAL at 08:18

## 2022-03-29 RX ADMIN — NIFEDIPINE 30 MG: 30 TABLET, EXTENDED RELEASE ORAL at 12:15

## 2022-03-29 RX ADMIN — IBUPROFEN 600 MG: 600 TABLET ORAL at 07:52

## 2022-03-29 ASSESSMENT — PAIN DESCRIPTION - ORIENTATION: ORIENTATION: LOWER

## 2022-03-29 ASSESSMENT — PAIN - FUNCTIONAL ASSESSMENT: PAIN_FUNCTIONAL_ASSESSMENT: ACTIVITIES ARE NOT PREVENTED

## 2022-03-29 ASSESSMENT — PAIN DESCRIPTION - DESCRIPTORS: DESCRIPTORS: ACHING;CRAMPING;DISCOMFORT

## 2022-03-29 ASSESSMENT — PAIN DESCRIPTION - PAIN TYPE: TYPE: ACUTE PAIN

## 2022-03-29 ASSESSMENT — PAIN DESCRIPTION - PROGRESSION: CLINICAL_PROGRESSION: GRADUALLY WORSENING

## 2022-03-29 ASSESSMENT — PAIN DESCRIPTION - LOCATION: LOCATION: ABDOMEN

## 2022-03-29 ASSESSMENT — PAIN SCALES - GENERAL
PAINLEVEL_OUTOF10: 2
PAINLEVEL_OUTOF10: 0

## 2022-03-29 ASSESSMENT — PAIN DESCRIPTION - FREQUENCY: FREQUENCY: INTERMITTENT

## 2022-03-29 ASSESSMENT — PAIN DESCRIPTION - ONSET: ONSET: GRADUAL

## 2022-03-29 NOTE — LACTATION NOTE
Mom stated breastfeeding is going well. Encouraged her to call us with breastfeeding questions or concerns. Being discharged home today.

## 2022-03-29 NOTE — PROGRESS NOTES
Discharge papers for mom and baby reviewed with patient, patient to also review and notify rn if any questions, patient understood instructions.

## 2022-03-29 NOTE — PROGRESS NOTES
PPD #2     Patient:  Otilia Enriquez     Admit Date:  3/26/2022  9:09 PM  Medical Record Number:  [id number]   Today's Date: 3/29/2022    S: No complaints - doing well - had another BP elevation with BF this AM - is s/p 20 mg Nifedipine at 0818 ; tolerating diet: yes - general ; ambulating well: yes - up in room and halls; voiding without difficulty:  yes - has no urinary complaints; bm: yes - normal; flatus: yes - normal; pain meds appropriate: yes - ibuprofen 600 mg; vaginal bleeding: lss than a period.     O:   Vitals:    03/28/22 2300 03/29/22 0728 03/29/22 0752 03/29/22 0845   BP: 133/72 (!) 161/91 (!) 157/92 (!) 142/81   Pulse: 67 57 72 81   Resp: 16 16 16 16   Temp: 98 °F (36.7 °C) 98.1 °F (36.7 °C)     TempSrc: Oral Oral     SpO2: 98% 97%     Weight:       Height:         Gen: A&O, cooperative, pleasant   Heart: RRR   Lungs: CTA b/l   Abd; soft, NT, non distended, +BS  Back: no CVA or paraspinal tenderness   Ext: No clubbing, cyanosis, edema:no , no cords palpable, no calf tenderness   Neuro: intact   Uterus: firm, well contracted, nt   Perineum: intact, healing well   Kirti pad: staining only, thin lochia    Lab Results   Component Value Date    HGB 11.1 (L) 03/26/2022    HCT 34.4 03/26/2022      Recent Results (from the past 72 hour(s))   CBC with Auto Differential    Collection Time: 03/26/22  9:40 PM   Result Value Ref Range    WBC 7.1 4.5 - 11.5 E9/L    RBC 3.80 3.50 - 5.50 E12/L    Hemoglobin 11.1 (L) 11.5 - 15.5 g/dL    Hematocrit 34.4 34.0 - 48.0 %    MCV 90.5 80.0 - 99.9 fL    MCH 29.2 26.0 - 35.0 pg    MCHC 32.3 32.0 - 34.5 %    RDW 12.5 11.5 - 15.0 fL    Platelets 187 780 - 659 E9/L    MPV 11.7 7.0 - 12.0 fL    Neutrophils % 65.4 43.0 - 80.0 %    Immature Granulocytes % 1.5 0.0 - 5.0 %    Lymphocytes % 23.0 20.0 - 42.0 %    Monocytes % 8.7 2.0 - 12.0 %    Eosinophils % 0.8 0.0 - 6.0 %    Basophils % 0.6 0.0 - 2.0 %    Neutrophils Absolute 4.67 1.80 - 7.30 E9/L    Immature Granulocytes # 0.11 E9/L Lymphocytes Absolute 1.64 1.50 - 4.00 E9/L    Monocytes Absolute 0.62 0.10 - 0.95 E9/L    Eosinophils Absolute 0.06 0.05 - 0.50 E9/L    Basophils Absolute 0.04 0.00 - 0.20 E9/L   TYPE AND SCREEN    Collection Time: 03/26/22  9:40 PM   Result Value Ref Range    ABO/Rh A POS     Antibody Screen NEG    Comprehensive Metabolic Panel    Collection Time: 03/26/22  9:40 PM   Result Value Ref Range    Sodium 136 132 - 146 mmol/L    Potassium 3.8 3.5 - 5.0 mmol/L    Chloride 104 98 - 107 mmol/L    CO2 22 22 - 29 mmol/L    Anion Gap 10 7 - 16 mmol/L    Glucose 84 74 - 99 mg/dL    BUN 10 6 - 20 mg/dL    CREATININE 0.6 0.5 - 1.0 mg/dL    GFR Non-African American >60 >=60 mL/min/1.73    GFR African American >60     Calcium 8.9 8.6 - 10.2 mg/dL    Total Protein 6.5 6.4 - 8.3 g/dL    Albumin 3.5 3.5 - 5.2 g/dL    Total Bilirubin <0.2 0.0 - 1.2 mg/dL    Alkaline Phosphatase 172 (H) 35 - 104 U/L    ALT 16 0 - 32 U/L    AST 22 0 - 31 U/L   Uric Acid    Collection Time: 03/26/22  9:40 PM   Result Value Ref Range    Uric Acid, Serum 4.5 2.4 - 5.7 mg/dL   Urinalysis with Microscopic    Collection Time: 03/26/22  9:40 PM   Result Value Ref Range    Color, UA Yellow Straw/Yellow    Clarity, UA Clear Clear    Glucose, Ur Negative Negative mg/dL    Bilirubin Urine Negative Negative    Ketones, Urine Negative Negative mg/dL    Specific Gravity, UA >=1.030 1.005 - 1.030    Blood, Urine TRACE-INTACT Negative    pH, UA 6.0 5.0 - 9.0    Protein, UA TRACE Negative mg/dL    Urobilinogen, Urine 0.2 <2.0 E.U./dL    Nitrite, Urine Negative Negative    Leukocyte Esterase, Urine Negative Negative    WBC, UA 2-5 0 - 5 /HPF    RBC, UA 1-3 0 - 2 /HPF    Epithelial Cells, UA MODERATE /HPF    Bacteria, UA MODERATE (A) None Seen /HPF    Crystals, UA Many (A) None Seen /HPF   Urine Drug Screen    Collection Time: 03/26/22  9:40 PM   Result Value Ref Range    Amphetamine Screen, Urine NOT DETECTED Negative <1000 ng/mL    Barbiturate Screen, Ur NOT DETECTED Negative < 200 ng/mL    Benzodiazepine Screen, Urine NOT DETECTED Negative < 200 ng/mL    Cannabinoid Scrn, Ur NOT DETECTED Negative < 50ng/mL    Cocaine Metabolite Screen, Urine NOT DETECTED Negative < 300 ng/mL    Opiate Scrn, Ur NOT DETECTED Negative < 300ng/mL    PCP Screen, Urine NOT DETECTED Negative < 25 ng/mL    Methadone Screen, Urine NOT DETECTED Negative <300 ng/mL    Oxycodone Urine NOT DETECTED Negative <100 ng/mL    FENTANYL SCREEN, URINE NOT DETECTED Negative <1 ng/mL    Drug Screen Comment: see below        A: 32 y.o. female [id number] at 40w3d weeks  PPD #2 S/P ; Episiotomy was not needed due to a spontaneous 2nd degree (subcutaneous tissue involved) vaginal laceration  Preeclampsia with severe features -blood pressures improved postpartum on magnesium with excellent diuresis, magnesium discontinued 12 hours  Blood pressure spike at PPD#1 0530 -185/88 status post 20 mg nifedipine -with repeat 131/78; spike PPD#2  - 161/91 and 157/92 status post 20 mg nifedipine -with repeat 141/81  Iron deficiency anemia pregnancy  Hereditary thrombophilia  Patient Active Problem List   Diagnosis    Congenital postural scoliosis    High risk multigravida, third trimester    Hereditary thrombophilia (677/1298 cmpd hetero, LORENA-I 5G/4G hetero, Factoe XIII V34L hetero)    Thrombophilia during pregnancy in third trimester     History of pre-eclampsia in prior pregnancy, currently pregnant, third trimester    Elevated blood pressure affecting pregnancy in third trimester, antepartum    40 3/7 weeks gestation of pregnancy    Post-term pregnancy, 40-42 weeks of gestation    Preeclampsia, severe, third trimester     (normal spontaneous vaginal delivery)    Term birth of  male   Tyshawn Sotelo Second degree perineal laceration, delivered, current hospitalization    Iron deficiency anemia of pregnancy       P: Routine PP care.    Add 30 mg Nifedipine ER (Procardia XL) q AM - monitor BP's today - will d/c home this evening  Discharge home with instructions, precautions. Prescription for Ibuprofen 600 mg. Continue PNV with Iron daily. BP check in office 1 week, D/C meds at home in 3 weeks and recheck BP 48 hrs in office. Follow-up office 6 weeks for final postpartum visit.     Marina Zambrano MD [address] [place] [address]  3/29/2022 11:49 AM

## 2022-03-29 NOTE — PLAN OF CARE
Problem: Discharge Planning:  Goal: Discharged to appropriate level of care  Description: Discharged to appropriate level of care  3/28/2022 2232 by Poornima Quezada RN  Outcome: Completed     Problem: Constipation:  Goal: Bowel elimination is within specified parameters  Description: Bowel elimination is within specified parameters  3/28/2022 2232 by Poornima Quezada RN  Outcome: Completed     Problem: Fluid Volume - Imbalance:  Goal: Absence of imbalanced fluid volume signs and symptoms  Description: Absence of imbalanced fluid volume signs and symptoms  3/28/2022 2232 by Poornima Quezada RN  Outcome: Completed  Goal: Absence of postpartum hemorrhage signs and symptoms  Description: Absence of postpartum hemorrhage signs and symptoms  3/28/2022 2232 by Poornima Quezada RN  Outcome: Completed     Problem: Infection - Risk of, Puerperal Infection:  Goal: Will show no infection signs and symptoms  Description: Will show no infection signs and symptoms  3/28/2022 2232 by Poornima Quezada RN  Outcome: Completed     Problem: Mood - Altered:  Goal: Mood stable  Description: Mood stable  3/28/2022 2232 by Poornima Quezada RN  Outcome: Completed     Problem: Pain - Acute:  Goal: Pain level will decrease  Description: Pain level will decrease  3/28/2022 2232 by Poornima Quezada RN  Outcome: Completed

## 2022-03-29 NOTE — FLOWSHEET NOTE
Pt called RN to room because she passed a clot approximately the size of a small lime in size. Clot broke up easily and bleeding is RNA and fundus firm. Will continue to monitor. Pt verbalizes understanding to call RN with any increase in bleeding or any more clots.

## 2022-03-29 NOTE — FLOWSHEET NOTE
Pt was nursing baby in bed when blood pressure was taken but denies any headaches, visual changes or complaints at this time. BP still elevated after feeding. Pt agreeable to BP medication.

## 2022-03-30 NOTE — DISCHARGE SUMMARY
Department of Obstetrics and Gynecology  Labor and Delivery  Discharge Summary    Patient:  Rodrigo Covington         Medical Record Number:  [id number]    Admit Date:  3/26/2022  9:09 PM    Discharge Date: 3/29/2022    Final Diagnosis:   Principal Problem (Resolved):    Preeclampsia, severe, third trimester  Active Problems:    Hereditary thrombophilia (677/1298 cmpd hetero, LORENA-I 5G/4G hetero, Loreatha Banner hetero)    Elevated blood pressure affecting pregnancy in third trimester, antepartum     (normal spontaneous vaginal delivery)    Term birth of  male    Second degree perineal laceration, delivered, current hospitalization    Iron deficiency anemia of pregnancy    Congenital postural scoliosis  Resolved Problems:    High risk multigravida, third trimester    40 3/7 weeks gestation of pregnancy    Post-term pregnancy, 40-42 weeks of gestation    Thrombophilia during pregnancy in third trimester     History of pre-eclampsia in prior pregnancy, currently pregnant, third trimester      Chief Complaint - Presenting Illness - Physical Findings:   Elevated blood pressure affecting pregnancy in third trimester, antepartum [O16.3]  40 weeks gestation of pregnancy [Z3A.40]  Normal labor and delivery [O80]  HPI: 32 y.o. W female [id number] high risk multigravid, postdates with thrombophilia and history of preeclampsia in prior pregnancy presented to labor delivery at 40w3d with headaches and blurred vision admitted with an initial blood pressure of 173/99 for magnesium sulfate and Pitocin induction.     Hospital Course:   Delivery Summary:   Procedure Date: 3/27/2022 1:38 PM      Pre-delivery Diagnosis: High risk postdates multigravid at 40 weeks 3 days; preeclampsia with severe features; hereditary thrombophilia (677/1298 compound heterozygote; LORENA-1 5G 4G heterozygote, factor XIII V34L heterozygote); congenital postural scoliosis      Patient Active Problem List   Diagnosis    Congenital postural scoliosis    to labor delivery at 40w3d with headaches and blurred vision admitted with an initial blood pressure of 173/99 for magnesium sulfate and Pitocin induction who progressed to complete post amniotomy and delivered Cephalic, left occiput anterior presentation via  @ 1338, under epidural anesthesia anesthesia,  over an intact perineum with a resulting 2nd degree laceration, without dystocia or complication, a Live Born Male infant, weighing 8# 1oz, 3650 grams, Clear fluid at delivery, bulb suctioned on perineum. A nuchal cord was not present. A delayed cord clamping was performed. Spontaneous cry,  Apgar's 1 minute:  8; 5 minute:  9. The placenta was delivered with gentle traction and was noted to be intact, whole and that the umbilical cord had three vessels noted. Episiotomy was not needed due to a spontaneous small 2nd degree (subcutaneous tissue involved) vaginal/perineal laceration. Repair performed in layers, per routine with  Vicryl 3.0 suture. Cervix, rectum, sphincter intact. Sponge, needle, and instrument counts correct x 2.     Delivery Summary:  Mother's Information    Labor Events     Labor?: No         Cervical Ripening: n/a   Now        Whitwell Gaby Bowens [[id number]]    Labor Events     Labor?: No   Steroids?: None  Cervical Ripening Date/Time: n/a     Antibiotics Received during Labor?: No       Rupture Identifier: Sac 1   Rupture Date/Time: 3/27/22 11:26:00   Rupture Type: AROM  Fluid Color: Clear  Fluid Odor: None  Fluid Volume:  Moderate  Induction: Oxytocin  Labor Complications: Pre-eclampsia      Anesthesia    Method: Epidural         Start Pushing           Labor onset date/time: 3/27/22 09:45:00 Now           Dilation complete date/time: 3/27/22 13:35:00 Now    Start pushing date/time: 3/27/2022 13:35:00       Labor Length    1st stage: 3h 50m  2nd stage: 0h 04m  3rd stage: 0h 06m        Delivery ()    Delivery Date/Time:  3/27/22 13:39:00   Delivery Type: Vaginal, Spontaneous      [place] Presentation    Presentation: Vertex  Position: Left  _: Occiput  _: Anterior      Shoulder Dystocia    Shoulder Dystocia Present?: No      Assisted Delivery Details    Forceps Attempted?: No  Vacuum Extractor Attempted?: No      Cord    Vessels: 3 Vessels  Complications: None  Delayed Cord Clamping?: Yes  Cord Clamped Date/Time: 3/27/2022 13:40:00  Cord Blood Disposition: Refrigerator  Gases Sent?: Yes      Placenta    Date/Time: 3/27/2022 13:45:00  Removal: Spontaneous  Appearance: Intact  Disposition: Placenta Refrigerator      Lacerations    Episiotomy: None  Perineal Lacerations: 2nd  Other Lacerations: no non-perineal laceration  Number of Repair Packets: 1      Vaginal Counts    Initial Count Personnel: Vincent Key RN  Initial Count Verified By: Deepthi Ponce    Sponges Needles Instruments   Initial Counts Correct Correct Correct   Final Counts Correct Correct Correct   Final Count Personnel: DR. Deepthi Ponce  Final Count Verified By: Vincent Key RN  Accurate Final Count?: Yes  If the count is incorrect due to Intentionally Retained Foreign Object (IRFO) add the IRFO LDA in Lines/Drains.   Add LDA: Link to [address] [place] [address]         Blood Loss  Mother: Monica Sosa" #[id number]          Start of Mother's Information           Delivery Blood Loss  22 0945 - 22 0806            Blood Hospital Encounter 300 mL     Total   300        End of Mother's Information  Mother: Monica Sosa" #[id number]             Delivery Providers    Delivering clinician: Ronnie Libman, MD       Provider Role       Obstetrician     Brady Silva RN Primary Nurse     Tasha Davila RN    Primary  Nurse         Assessment    Living Status: Living  Delivery Location: L&D             Apgar Scoring Key:    0 1 2     Skin Color: Blue or pale Acrocyanotic Completely pink     Heart Rate: Absent <100 bpm >100 bpm     Reflex Irritability: No response Grimace Cry or active withdrawal     Muscle Tone: Limp Some flexion Active motion     Respiratory Effort: Absent Weak cry; hypoventilation Good, crying                    Skin Color:   Heart Rate:   Reflex Irritability:   Muscle Tone:   Respiratory Effort: Total:            1 Minute:    0    2    2    2    2    8        Apgar 1 total from OB History    5 Minute:    1    2    2    2    2    9        Apgar 5 total from OB History    10 Minute:              15 Minute:              20 Minute:                      Apgars Assigned By: SHARON OLMSTEAD RN            Resuscitation    Method: Bulb Suction             Measurements    Birth Weight: 3650 g Birth Length: 0.521 m   Head Circumference: 0.35 m                    Title    Skin to Skin Initiation Date/Time:              Skin to Skin End Date/Time:        Reason Skin to Skin Not Initiated: Patient Refused             Specimen:  Nonn                Estimated blood loss: 300 mL     Condition:  infant stable to general nursery and mother stable to maternity     Blood Type and Rh: A POS     Rubella Immunity Status:   Immune           Infant Feeding:    breast     Attending Attestation: I performed the procedure. Postdelivery patient was continued on magnesium sulfate. Her blood pressures improved and she was diuresing well, as a result the magnesium was discontinued at 12 hours and she was transferred to the postpartum floor. Patient had blood pressure elevations post partum day #1 and number 2 in the morning which required treatment with nifedipine 20 mg p.o. doses. On the morning of the second postpartum day she was given nifedipine extended release 30 mg which kept her blood pressures in the 140 over 90s range. She will be discharged home on the 60 mg dose to start in the morning. She was advised to monitor her blood pressures at home. The patient had an otherwise unremarkable Hospital Course. Her care was advanced per routine protocol.   Her vital signs were stable, she remained afebrile, and her physical exam was unremarkable throughout her hospitalization. She was subsequently discharged home on the second Hospital Day as she was tolerating her diet, ambulating well, voiding without difficulty and had appropriate flatus with a bowel movement.     Recent Results (from the past 72 hour(s))   CBC with Auto Differential    Collection Time: 03/26/22  9:40 PM   Result Value Ref Range    WBC 7.1 4.5 - 11.5 E9/L    RBC 3.80 3.50 - 5.50 E12/L    Hemoglobin 11.1 (L) 11.5 - 15.5 g/dL    Hematocrit 34.4 34.0 - 48.0 %    MCV 90.5 80.0 - 99.9 fL    MCH 29.2 26.0 - 35.0 pg    MCHC 32.3 32.0 - 34.5 %    RDW 12.5 11.5 - 15.0 fL    Platelets 662 326 - 231 E9/L    MPV 11.7 7.0 - 12.0 fL    Neutrophils % 65.4 43.0 - 80.0 %    Immature Granulocytes % 1.5 0.0 - 5.0 %    Lymphocytes % 23.0 20.0 - 42.0 %    Monocytes % 8.7 2.0 - 12.0 %    Eosinophils % 0.8 0.0 - 6.0 %    Basophils % 0.6 0.0 - 2.0 %    Neutrophils Absolute 4.67 1.80 - 7.30 E9/L    Immature Granulocytes # 0.11 E9/L    Lymphocytes Absolute 1.64 1.50 - 4.00 E9/L    Monocytes Absolute 0.62 0.10 - 0.95 E9/L    Eosinophils Absolute 0.06 0.05 - 0.50 E9/L    Basophils Absolute 0.04 0.00 - 0.20 E9/L   TYPE AND SCREEN    Collection Time: 03/26/22  9:40 PM   Result Value Ref Range    ABO/Rh A POS     Antibody Screen NEG    Comprehensive Metabolic Panel    Collection Time: 03/26/22  9:40 PM   Result Value Ref Range    Sodium 136 132 - 146 mmol/L    Potassium 3.8 3.5 - 5.0 mmol/L    Chloride 104 98 - 107 mmol/L    CO2 22 22 - 29 mmol/L    Anion Gap 10 7 - 16 mmol/L    Glucose 84 74 - 99 mg/dL    BUN 10 6 - 20 mg/dL    CREATININE 0.6 0.5 - 1.0 mg/dL    GFR Non-African American >60 >=60 mL/min/1.73    GFR African American >60     Calcium 8.9 8.6 - 10.2 mg/dL    Total Protein 6.5 6.4 - 8.3 g/dL    Albumin 3.5 3.5 - 5.2 g/dL    Total Bilirubin <0.2 0.0 - 1.2 mg/dL    Alkaline Phosphatase 172 (H) 35 - 104 U/L    ALT 16 0 - 32 U/L    AST 22 0 - 31 U/L   Uric Acid    Collection Time: 03/26/22 9:40 PM   Result Value Ref Range    Uric Acid, Serum 4.5 2.4 - 5.7 mg/dL   Urinalysis with Microscopic    Collection Time: 03/26/22  9:40 PM   Result Value Ref Range    Color, UA Yellow Straw/Yellow    Clarity, UA Clear Clear    Glucose, Ur Negative Negative mg/dL    Bilirubin Urine Negative Negative    Ketones, Urine Negative Negative mg/dL    Specific Gravity, UA >=1.030 1.005 - 1.030    Blood, Urine TRACE-INTACT Negative    pH, UA 6.0 5.0 - 9.0    Protein, UA TRACE Negative mg/dL    Urobilinogen, Urine 0.2 <2.0 E.U./dL    Nitrite, Urine Negative Negative    Leukocyte Esterase, Urine Negative Negative    WBC, UA 2-5 0 - 5 /HPF    RBC, UA 1-3 0 - 2 /HPF    Epithelial Cells, UA MODERATE /HPF    Bacteria, UA MODERATE (A) None Seen /HPF    Crystals, UA Many (A) None Seen /HPF   Urine Drug Screen    Collection Time: 03/26/22  9:40 PM   Result Value Ref Range    Amphetamine Screen, Urine NOT DETECTED Negative <1000 ng/mL    Barbiturate Screen, Ur NOT DETECTED Negative < 200 ng/mL    Benzodiazepine Screen, Urine NOT DETECTED Negative < 200 ng/mL    Cannabinoid Scrn, Ur NOT DETECTED Negative < 50ng/mL    Cocaine Metabolite Screen, Urine NOT DETECTED Negative < 300 ng/mL    Opiate Scrn, Ur NOT DETECTED Negative < 300ng/mL    PCP Screen, Urine NOT DETECTED Negative < 25 ng/mL    Methadone Screen, Urine NOT DETECTED Negative <300 ng/mL    Oxycodone Urine NOT DETECTED Negative <100 ng/mL    FENTANYL SCREEN, URINE NOT DETECTED Negative <1 ng/mL    Drug Screen Comment: see below      Physicians Following Patient During Hospitalization - Reason For Care:  Admitting Physician: Divya Rodriguez  Delivering Physician: Nikia Manriquez Physician(s):    PP#1 Kaela   PP#2 Divya Rodriguez  Discharging Physician: Divya Rodriguez  Consultant(s):  n/a    Discharge Condition:  · Level of Function:  Stable  · Caregiver Arrangements and Educational Efforts: Written Discharge Instructions were verbally reviewed and given to the Patient.   · Special Problems: Patient is to monitor blood pressures at home and call with persistent elevations greater than 296 systolic or 90 diastolic. She is to be reassessed in 1 week's time for blood pressure check and discontinue the meds in 3 weeks and follow-up within 24 to 48 hours for blood pressure evaluation. Plans For Continuing Care:  · Unreported Test Results and Intended Follow-Up: 1 week. · Instructions for Physical Activity: No driving for 1 week(s). No sex or tampon use for 6 weeks. No douching. No heavy lifting (greater than baby and carrier) for 6 weeks. Frequent ambulation with stairs - start slowly then increase as tolerated. Return to work in 6 weeks. Showers are fine - avoid bath tubs, hot tubs, swimming. · Instructions for Diet: Regular diet  · Discharge Medications:  Current Discharge Medication List      START taking these medications    Details   benzocaine-menthol (DERMOPLAST) 20-0.5 % AERO spray Apply topically as needed for Pain . May use hospital sample as needed. Refills: 0      docusate sodium (COLACE) 100 MG capsule Take 1 capsule by mouth 2 times daily as needed for Constipation      ibuprofen (ADVIL;MOTRIN) 600 MG tablet Take 1 tablet by mouth every 6 hours as needed for Pain  Qty: 30 tablet, Refills: 3      lansinoh lanolin CREA ointment Apply 1 applicator topically as needed for Dry Skin (nipple discomfort)      witch hazel-glycerin (TUCKS) pad Place rectally as needed.   Refills: 0      NIFEdipine (PROCARDIA XL) 60 MG extended release tablet Take 1 tablet by mouth daily  Qty: 30 tablet, Refills: 1         CONTINUE these medications which have CHANGED    Details   magnesium 30 MG tablet Take 1 tablet by mouth daily  Refills: 0         CONTINUE these medications which have NOT CHANGED    Details   calcium carbonate (OSCAL) 500 MG TABS tablet Take 500 mg by mouth daily      zinc gluconate 50 MG tablet Take 50 mg by mouth daily      Cholecalciferol (VITAMIN D3) 1.25 MG (02724 UT) CAPS Take by mouth      aspirin (ASPIRIN CHILDRENS) 81 MG chewable tablet Take 1 tablet by mouth daily  Qty: 30 tablet, Refills: 12      folic acid (FOLVITE) 1 MG tablet Take 2 tablets by mouth daily  Qty: 60 tablet, Refills: 12      Cyanocobalamin (B-12) 100 MCG TABS Take 100 mcg by mouth daily  Qty: 30 tablet, Refills: 12      pyridoxine (B-6) 25 MG tablet Take 1 tablet by mouth daily  Qty: 30 tablet, Refills: 12      Prenatal MV-Min-Fe Fum-FA-DHA (PRENATAL 1 PO) Take by mouth            Follow-Up Visit Plan:  1 week for blood pressure check, 3 weeks to see if tolerating off meds, 6 week(s) time for final postpartum visit   Disposition: Home. Time Spent on Discharge:  30 minutes were spent in patient examination, evaluation, counseling as well as medication reconciliation, prescriptions for required medications, discharge plan and follow up.       Orbie Apgar, MD MD,FACOG    3/29/2022 9:12 PM

## 2022-03-30 NOTE — PROGRESS NOTES
PPD #2     Patient:  Katie Martinez     Admit Date:  3/26/2022  9:09 PM  Medical Record Number:  [id number]   Today's Date: 3/29/2022    S: No complaints - doing well - had Prcardia XL this morning - feels great, no HA, Visual changes, fatigue or SE otherwise; tolerating diet: yes - general ; ambulating well: yes - up in room and halls; voiding without difficulty:  yes - has no urinary complaints; bm: yes - normal; flatus: yes - normal; pain meds appropriate: yes - ibuprofen 600 mg; vaginal bleeding: lss than a period.     O:   Vitals:    03/29/22 1215 03/29/22 1512 03/29/22 1730 03/29/22 1950   BP: (!) 142/72 (!) 140/91 (!) 141/93 (!) 140/90   Pulse:  96  93   Resp:  16  16   Temp:  98.2 °F (36.8 °C)  98 °F (36.7 °C)   TempSrc:  Oral  Oral   SpO2:  96%  97%   Weight:       Height:         Gen: A&O, cooperative, pleasant   Heart: RRR   Lungs: CTA b/l   Abd; soft, NT, non distended, +BS  Back: no CVA or paraspinal tenderness   Ext: No clubbing, cyanosis, edema:no , no cords palpable, no calf tenderness   Neuro: intact   Uterus: firm, well contracted, nt   Perineum: intact, healing well   Kirti pad: staining only, thin lochia    Lab Results   Component Value Date    HGB 11.1 (L) 03/26/2022    HCT 34.4 03/26/2022      Recent Results (from the past 72 hour(s))   CBC with Auto Differential    Collection Time: 03/26/22  9:40 PM   Result Value Ref Range    WBC 7.1 4.5 - 11.5 E9/L    RBC 3.80 3.50 - 5.50 E12/L    Hemoglobin 11.1 (L) 11.5 - 15.5 g/dL    Hematocrit 34.4 34.0 - 48.0 %    MCV 90.5 80.0 - 99.9 fL    MCH 29.2 26.0 - 35.0 pg    MCHC 32.3 32.0 - 34.5 %    RDW 12.5 11.5 - 15.0 fL    Platelets 011 540 - 423 E9/L    MPV 11.7 7.0 - 12.0 fL    Neutrophils % 65.4 43.0 - 80.0 %    Immature Granulocytes % 1.5 0.0 - 5.0 %    Lymphocytes % 23.0 20.0 - 42.0 %    Monocytes % 8.7 2.0 - 12.0 %    Eosinophils % 0.8 0.0 - 6.0 %    Basophils % 0.6 0.0 - 2.0 %    Neutrophils Absolute 4.67 1.80 - 7.30 E9/L    Immature Granulocytes # 0.11 E9/L    Lymphocytes Absolute 1.64 1.50 - 4.00 E9/L    Monocytes Absolute 0.62 0.10 - 0.95 E9/L    Eosinophils Absolute 0.06 0.05 - 0.50 E9/L    Basophils Absolute 0.04 0.00 - 0.20 E9/L   TYPE AND SCREEN    Collection Time: 03/26/22  9:40 PM   Result Value Ref Range    ABO/Rh A POS     Antibody Screen NEG    Comprehensive Metabolic Panel    Collection Time: 03/26/22  9:40 PM   Result Value Ref Range    Sodium 136 132 - 146 mmol/L    Potassium 3.8 3.5 - 5.0 mmol/L    Chloride 104 98 - 107 mmol/L    CO2 22 22 - 29 mmol/L    Anion Gap 10 7 - 16 mmol/L    Glucose 84 74 - 99 mg/dL    BUN 10 6 - 20 mg/dL    CREATININE 0.6 0.5 - 1.0 mg/dL    GFR Non-African American >60 >=60 mL/min/1.73    GFR African American >60     Calcium 8.9 8.6 - 10.2 mg/dL    Total Protein 6.5 6.4 - 8.3 g/dL    Albumin 3.5 3.5 - 5.2 g/dL    Total Bilirubin <0.2 0.0 - 1.2 mg/dL    Alkaline Phosphatase 172 (H) 35 - 104 U/L    ALT 16 0 - 32 U/L    AST 22 0 - 31 U/L   Uric Acid    Collection Time: 03/26/22  9:40 PM   Result Value Ref Range    Uric Acid, Serum 4.5 2.4 - 5.7 mg/dL   Urinalysis with Microscopic    Collection Time: 03/26/22  9:40 PM   Result Value Ref Range    Color, UA Yellow Straw/Yellow    Clarity, UA Clear Clear    Glucose, Ur Negative Negative mg/dL    Bilirubin Urine Negative Negative    Ketones, Urine Negative Negative mg/dL    Specific Gravity, UA >=1.030 1.005 - 1.030    Blood, Urine TRACE-INTACT Negative    pH, UA 6.0 5.0 - 9.0    Protein, UA TRACE Negative mg/dL    Urobilinogen, Urine 0.2 <2.0 E.U./dL    Nitrite, Urine Negative Negative    Leukocyte Esterase, Urine Negative Negative    WBC, UA 2-5 0 - 5 /HPF    RBC, UA 1-3 0 - 2 /HPF    Epithelial Cells, UA MODERATE /HPF    Bacteria, UA MODERATE (A) None Seen /HPF    Crystals, UA Many (A) None Seen /HPF   Urine Drug Screen    Collection Time: 03/26/22  9:40 PM   Result Value Ref Range    Amphetamine Screen, Urine NOT DETECTED Negative <1000 ng/mL    Barbiturate Screen, Ur NOT DETECTED Negative < 200 ng/mL    Benzodiazepine Screen, Urine NOT DETECTED Negative < 200 ng/mL    Cannabinoid Scrn, Ur NOT DETECTED Negative < 50ng/mL    Cocaine Metabolite Screen, Urine NOT DETECTED Negative < 300 ng/mL    Opiate Scrn, Ur NOT DETECTED Negative < 300ng/mL    PCP Screen, Urine NOT DETECTED Negative < 25 ng/mL    Methadone Screen, Urine NOT DETECTED Negative <300 ng/mL    Oxycodone Urine NOT DETECTED Negative <100 ng/mL    FENTANYL SCREEN, URINE NOT DETECTED Negative <1 ng/mL    Drug Screen Comment: see below        A: 32 y.o. female [id number] at 40w3d weeks  PPD #2 S/P ; Episiotomy was not needed due to a spontaneous 2nd degree (subcutaneous tissue involved) vaginal laceration  Preeclampsia with severe features -blood pressures improved postpartum on magnesium with excellent diuresis, magnesium discontinued 12 hours  Blood pressure spike at PPD#1 0530 -185/88 status post 20 mg nifedipine -with repeat 131/78; spike PPD#2  - 161/91 and 157/92 status post 20 mg nifedipine -with repeat 141/81; /90's on 30 Procardia XL. Iron deficiency anemia pregnancy  Hereditary thrombophilia  Patient Active Problem List   Diagnosis    Congenital postural scoliosis    High risk multigravida, third trimester    Hereditary thrombophilia (677/1298 cmpd hetero, LORENA-I 5G/4G hetero, Percell Bereket hetero)    Thrombophilia during pregnancy in third trimester     History of pre-eclampsia in prior pregnancy, currently pregnant, third trimester    Elevated blood pressure affecting pregnancy in third trimester, antepartum    40 3/7 weeks gestation of pregnancy    Post-term pregnancy, 40-42 weeks of gestation    Preeclampsia, severe, third trimester     (normal spontaneous vaginal delivery)    Term birth of  male   [place] Second degree perineal laceration, delivered, current hospitalization    Iron deficiency anemia of pregnancy       P: Routine PP care.    Increase to 60 mg Nifedipine ER (Procardia XL) q AM - patient is to monitor BP's at home - call with HA, visual changes or persistent BP >809 systolic or 90 diastolic. Discharge home with instructions, precautions. Prescription for Ibuprofen 600 mg. Continue PNV with Iron daily. BP check in office 1 week, D/C meds at home in 3 weeks and recheck BP 48 hrs after stopping meds in office. Follow-up office 6 weeks for final postpartum visit.     Santhosh Bella MD FACOG  3/29/2022 8:57 PM

## 2022-03-30 NOTE — PROGRESS NOTES
Pt discharged home, instructions and 2 script  given, patient verbalized understanding. Follow-up in 1 week for blood pressure check verbalized. Bands verified with baby and hugs tag removed. Mom and baby transferred off unit in wheelchair and car set.

## 2022-05-16 ENCOUNTER — OFFICE VISIT (OUTPATIENT)
Dept: FAMILY MEDICINE CLINIC | Age: 32
End: 2022-05-16
Payer: COMMERCIAL

## 2022-05-16 VITALS
OXYGEN SATURATION: 98 % | WEIGHT: 161 LBS | DIASTOLIC BLOOD PRESSURE: 76 MMHG | BODY MASS INDEX: 28.53 KG/M2 | HEART RATE: 78 BPM | HEIGHT: 63 IN | TEMPERATURE: 97.3 F | SYSTOLIC BLOOD PRESSURE: 118 MMHG | RESPIRATION RATE: 18 BRPM

## 2022-05-16 PROCEDURE — G8417 CALC BMI ABV UP PARAM F/U: HCPCS | Performed by: FAMILY MEDICINE

## 2022-05-16 PROCEDURE — 99213 OFFICE O/P EST LOW 20 MIN: CPT | Performed by: FAMILY MEDICINE

## 2022-05-16 PROCEDURE — G8427 DOCREV CUR MEDS BY ELIG CLIN: HCPCS | Performed by: FAMILY MEDICINE

## 2022-05-16 PROCEDURE — 1036F TOBACCO NON-USER: CPT | Performed by: FAMILY MEDICINE

## 2022-05-16 RX ORDER — CEPHALEXIN 500 MG/1
500 CAPSULE ORAL 3 TIMES DAILY
Qty: 21 CAPSULE | Refills: 1 | Status: SHIPPED | OUTPATIENT
Start: 2022-05-16 | End: 2022-05-23

## 2022-05-16 NOTE — PROGRESS NOTES
OFFICE NOTE    5/16/22  Name: Ginna Weber  WCX:[id number]   Sex:female   Age:31 y.o. SUBJECTIVE  Chief Complaint   Patient presents with    Other     mastitis, tender, right side     Fever       HPI 3 children, youngest 9 weeks old. Is tongue tied, making nursing more challenging. Has had mastitis before with previous children    Review of Systems   Low grade temp. No rigor    Current Outpatient Medications:     cephALEXin (KEFLEX) 500 MG capsule, Take 1 capsule by mouth 3 times daily for 7 days, Disp: 21 capsule, Rfl: 1    Drospirenone (SLYND) 4 MG TABS, Take 1 tablet by mouth daily, Disp: 28 tablet, Rfl: 11    benzocaine-menthol (DERMOPLAST) 20-0.5 % AERO spray, Apply topically as needed for Pain . May use hospital sample as needed.  (Patient not taking: Reported on 5/9/2022), Disp: , Rfl: 0    docusate sodium (COLACE) 100 MG capsule, Take 1 capsule by mouth 2 times daily as needed for Constipation (Patient not taking: Reported on 5/9/2022), Disp: , Rfl:     ibuprofen (ADVIL;MOTRIN) 600 MG tablet, Take 1 tablet by mouth every 6 hours as needed for Pain (Patient not taking: Reported on 5/9/2022), Disp: 30 tablet, Rfl: 3    lansinoh lanolin CREA ointment, Apply 1 applicator topically as needed for Dry Skin (nipple discomfort) (Patient not taking: Reported on 5/9/2022), Disp: , Rfl:     NIFEdipine (PROCARDIA XL) 60 MG extended release tablet, Take 1 tablet by mouth daily (Patient not taking: Reported on 5/9/2022), Disp: 30 tablet, Rfl: 1    magnesium 30 MG tablet, Take 1 tablet by mouth daily (Patient not taking: Reported on 5/9/2022), Disp: , Rfl: 0    calcium carbonate (OSCAL) 500 MG TABS tablet, Take 500 mg by mouth daily (Patient not taking: Reported on 5/9/2022), Disp: , Rfl:     zinc gluconate 50 MG tablet, Take 50 mg by mouth daily (Patient not taking: Reported on 5/9/2022), Disp: , Rfl:     Cholecalciferol (VITAMIN D3) 1.25 MG (40633 UT) CAPS, Take by mouth (Patient not taking: Reported on 5/9/2022), Disp: , Rfl:     aspirin (ASPIRIN CHILDRENS) 81 MG chewable tablet, Take 1 tablet by mouth daily (Patient not taking: Reported on 5/9/2022), Disp: 30 tablet, Rfl: 12    folic acid (FOLVITE) 1 MG tablet, Take 2 tablets by mouth daily (Patient not taking: Reported on 5/9/2022), Disp: 60 tablet, Rfl: 12    Cyanocobalamin (B-12) 100 MCG TABS, Take 100 mcg by mouth daily (Patient not taking: Reported on 5/9/2022), Disp: 30 tablet, Rfl: 12    pyridoxine (B-6) 25 MG tablet, Take 1 tablet by mouth daily (Patient not taking: Reported on 5/9/2022), Disp: 30 tablet, Rfl: 12    Prenatal MV-Min-Fe Fum-FA-DHA (PRENATAL 1 PO), Take by mouth (Patient not taking: Reported on 5/16/2022), Disp: , Rfl:   Allergies   Allergen Reactions    Latex     Spearmint Flavor        Past Medical History:   Diagnosis Date    Asthma     Exercise Induced    Eczema     Environmental allergies     Hypertension     preeclampsia prev pregnancy    Left ovarian cyst     Scoliosis      Past Surgical History:   Procedure Laterality Date    OVARIAN CYST SURGERY       No family history on file. Social History     Tobacco History     Smoking Status  Never Smoker    Smokeless Tobacco Use  Never Used          Alcohol History     Alcohol Use Status  Not Currently Comment  socially          Drug Use     Drug Use Status  No          Sexual Activity     Sexually Active  Yes Partners  Male                OBJECTIVE  Vitals:    05/16/22 1507   BP: 118/76   Pulse: 78   Resp: 18   Temp: 97.3 °F (36.3 °C)   TempSrc: Temporal   SpO2: 98%   Weight: 161 lb (73 kg)   Height: 5' 3\" (1.6 m)        Body mass index is 28.52 kg/m². No orders of the defined types were placed in this encounter. EXAM   Physical Exam  Vitals and nursing note reviewed. Constitutional:       Appearance: Normal appearance. She is well-developed and normal weight.    HENT:      Right Ear: Tympanic membrane, ear canal and external ear normal.      Left Ear: Tympanic membrane, ear canal and external ear normal.      Nose: Nose normal.   Eyes:      Conjunctiva/sclera: Conjunctivae normal.   Neck:      Thyroid: No thyroid mass or thyromegaly. Vascular: No JVD. Trachea: Trachea normal.   Cardiovascular:      Rate and Rhythm: Normal rate and regular rhythm. Heart sounds: Normal heart sounds. No murmur heard. No gallop. Pulmonary:      Effort: Pulmonary effort is normal.      Breath sounds: Normal breath sounds. No wheezing or rales. Comments: Mastitis lower outer quadrant right breast, no abscess, but flare present. No adenopathy  Abdominal:      General: Bowel sounds are normal. There is no distension. Palpations: Abdomen is soft. There is no mass. Tenderness: There is no abdominal tenderness. There is no guarding. Musculoskeletal:         General: Normal range of motion. Cervical back: Neck supple. Lymphadenopathy:      Cervical: No cervical adenopathy. Skin:     General: Skin is warm and dry. Coloration: Skin is not jaundiced. Findings: No bruising or rash. Neurological:      General: No focal deficit present. Mental Status: She is alert and oriented to person, place, and time. Motor: No abnormal muscle tone. Psychiatric:         Mood and Affect: Mood normal.         Behavior: Behavior normal.           [employer] was seen today for other and fever. Diagnoses and all orders for this visit:    Mastitis associated with childbirth, delivered    Other orders  -     cephALEXin (KEFLEX) 500 MG capsule; Take 1 capsule by mouth 3 times daily for 7 days    Can nurse through this which she is doing. Infant will have tongue snipped at [facility] soon Warm compresses may help      No follow-ups on file.     Electronically signed by Natanael Bowens MD on 5/16/22 at 3:26 PM EDT

## 2023-01-14 ENCOUNTER — OFFICE VISIT (OUTPATIENT)
Dept: FAMILY MEDICINE CLINIC | Age: 33
End: 2023-01-14
Payer: COMMERCIAL

## 2023-01-14 VITALS
SYSTOLIC BLOOD PRESSURE: 112 MMHG | TEMPERATURE: 97.5 F | DIASTOLIC BLOOD PRESSURE: 80 MMHG | HEIGHT: 63 IN | OXYGEN SATURATION: 96 % | HEART RATE: 70 BPM | WEIGHT: 148.8 LBS | BODY MASS INDEX: 26.36 KG/M2

## 2023-01-14 DIAGNOSIS — R09.82 POSTNASAL DRIP: ICD-10-CM

## 2023-01-14 DIAGNOSIS — J01.90 ACUTE NON-RECURRENT SINUSITIS, UNSPECIFIED LOCATION: Primary | ICD-10-CM

## 2023-01-14 DIAGNOSIS — R05.9 COUGH, UNSPECIFIED TYPE: ICD-10-CM

## 2023-01-14 DIAGNOSIS — R09.81 NASAL CONGESTION: ICD-10-CM

## 2023-01-14 DIAGNOSIS — H10.9 CONJUNCTIVITIS OF BOTH EYES, UNSPECIFIED CONJUNCTIVITIS TYPE: ICD-10-CM

## 2023-01-14 PROCEDURE — G8484 FLU IMMUNIZE NO ADMIN: HCPCS | Performed by: PHYSICIAN ASSISTANT

## 2023-01-14 PROCEDURE — 1036F TOBACCO NON-USER: CPT | Performed by: PHYSICIAN ASSISTANT

## 2023-01-14 PROCEDURE — 99203 OFFICE O/P NEW LOW 30 MIN: CPT | Performed by: PHYSICIAN ASSISTANT

## 2023-01-14 PROCEDURE — G8417 CALC BMI ABV UP PARAM F/U: HCPCS | Performed by: PHYSICIAN ASSISTANT

## 2023-01-14 PROCEDURE — G8427 DOCREV CUR MEDS BY ELIG CLIN: HCPCS | Performed by: PHYSICIAN ASSISTANT

## 2023-01-14 RX ORDER — METHYLPREDNISOLONE 4 MG/1
TABLET ORAL
Qty: 1 KIT | Refills: 0 | Status: SHIPPED | OUTPATIENT
Start: 2023-01-14

## 2023-01-14 RX ORDER — AMOXICILLIN AND CLAVULANATE POTASSIUM 875; 125 MG/1; MG/1
1 TABLET, FILM COATED ORAL 2 TIMES DAILY
Qty: 20 TABLET | Refills: 0 | Status: SHIPPED | OUTPATIENT
Start: 2023-01-14 | End: 2023-01-24

## 2023-01-14 RX ORDER — POLYMYXIN B SULFATE AND TRIMETHOPRIM 1; 10000 MG/ML; [USP'U]/ML
1 SOLUTION OPHTHALMIC EVERY 6 HOURS
Qty: 20 ML | Refills: 0 | Status: SHIPPED | OUTPATIENT
Start: 2023-01-14 | End: 2023-01-24

## 2023-01-14 NOTE — PROGRESS NOTES
23  Conception Bobby : 1990 Sex: female  Age 28 y.o. Subjective:  Chief Complaint   Patient presents with    Cough     Onset last thursday    Pharyngitis    Drainage         HPI:   Conception Bobby , 28 y.o. female presents to express care for evaluation of cough, sore throat, drainage    HPI  59-year-old female presents to express care for evaluation of cough, congestion, sore throat, drainage. The patient has had the symptoms ongoing since Thursday. The patient started with the symptoms then ultimately transferred to her son who is being evaluated for the same. The patient's any fever, chills. No lightheadedness, dizziness. The patient not currently on any antibiotics. The patient does have some drainage from her eyes as well. ROS:   Unless otherwise stated in this report the patient's positive and negative responses for review of systems for constitutional, eyes, ENT, cardiovascular, respiratory, gastrointestinal, neurological, , musculoskeletal, and integument systems and related systems to the presenting problem are either stated in the history of present illness or were not pertinent or were negative for the symptoms and/or complaints related to the presenting medical problem. Positives and pertinent negatives as per HPI. All others reviewed and are negative. PMH:     Past Medical History:   Diagnosis Date    Asthma     Exercise Induced    Eczema     Environmental allergies     Hypertension     preeclampsia prev pregnancy    Left ovarian cyst     Scoliosis        Past Surgical History:   Procedure Laterality Date    OVARIAN CYST SURGERY         History reviewed. No pertinent family history.     Medications:     Current Outpatient Medications:     methylPREDNISolone (MEDROL DOSEPACK) 4 MG tablet, Take by mouth., Disp: 1 kit, Rfl: 0    amoxicillin-clavulanate (AUGMENTIN) 875-125 MG per tablet, Take 1 tablet by mouth 2 times daily for 10 days, Disp: 20 tablet, Rfl: 0 trimethoprim-polymyxin b (POLYTRIM) 87787-2.1 UNIT/ML-% ophthalmic solution, Place 1 drop into both eyes in the morning and 1 drop at noon and 1 drop in the evening and 1 drop before bedtime. Do all this for 10 days. , Disp: 20 mL, Rfl: 0    Drospirenone (SLYND) 4 MG TABS, Take 1 tablet by mouth daily, Disp: 28 tablet, Rfl: 11    Allergies: Allergies   Allergen Reactions    Latex     Spearmint Flavor        Social History:     Social History     Tobacco Use    Smoking status: Never    Smokeless tobacco: Never   Vaping Use    Vaping Use: Never used   Substance Use Topics    Alcohol use: Not Currently     Comment: socially    Drug use: No       Patient lives at home. Physical Exam:     Vitals:    01/14/23 0831   BP: 112/80   Pulse: 70   Temp: 97.5 °F (36.4 °C)   TempSrc: Temporal   SpO2: 96%   Weight: 148 lb 12.8 oz (67.5 kg)   Height: 5' 3\" (1.6 m)       Exam:  Physical Exam  Nurse's notes and vital signs reviewed. The patient is not hypoxic. ? General: Alert, no acute distress, patient resting comfortably Patient is not toxic or lethargic. Skin: Warm, intact, no pallor noted. There is no evidence of rash at this time. Head: Normocephalic, atraumatic  Eye: Very slight injection to the bilateral conjunctival, no evidence of drainage or discharge currently  Ears, Nose, Throat: Right tympanic membrane clear, left tympanic membrane clear. No drainage or discharge noted. No pre- or post-auricular tenderness, erythema, or swelling noted. nasal congestion, rhinorrhea, no epistaxis  Posterior oropharynx shows erythema but no evidence of tonsillar hypertrophy, or exudate. the uvula is midline. No trismus or drooling is noted. Moist mucous membranes. Neck: No anterior/posterior lymphadenopathy noted. No erythema, no masses, no fluctuance or induration noted. No meningeal signs. Cardiovascular: Regular Rate and Rhythm  Respiratory: No acute distress, no rhonchi, wheezing or crackles noted.  No stridor or retractions are noted. Neurological: A&O x4, normal speech  Psychiatric: Cooperative       Testing:           Medical Decision Making:     Vital signs reviewed    Past medical history reviewed. Allergies reviewed. Medications reviewed. Patient on arrival does not appear to be in any apparent distress or discomfort. The patient has been seen and evaluated. The patient does not appear to be toxic or lethargic. The patient will be treated with Augmentin, Medrol Dosepak, and Polytrim. The patient is breast-feeding. We discussed side effects of the medication. She was comfortable with this plan. The patient was educated on the proper dosage of motrin and tylenol and the appropriate intervals of each. The patient is to increase fluid intake over the next several days. The patient is to use OTC decongestant as needed. The patient is to return to express care or go directly to the emergency department should any of the signs or symptoms worsen. The patient is to followup with primary care physician in 2-3 days for repeat evaluation. The patient has no other questions or concerns at this time the patient will be discharged home. Clinical Impression:   Dolly Garcia was seen today for cough, pharyngitis and drainage. Diagnoses and all orders for this visit:    Acute non-recurrent sinusitis, unspecified location    Postnasal drip    Nasal congestion    Cough, unspecified type    Conjunctivitis of both eyes, unspecified conjunctivitis type    Other orders  -     methylPREDNISolone (MEDROL DOSEPACK) 4 MG tablet; Take by mouth. -     amoxicillin-clavulanate (AUGMENTIN) 875-125 MG per tablet; Take 1 tablet by mouth 2 times daily for 10 days  -     trimethoprim-polymyxin b (POLYTRIM) 64244-2.1 UNIT/ML-% ophthalmic solution; Place 1 drop into both eyes in the morning and 1 drop at noon and 1 drop in the evening and 1 drop before bedtime. Do all this for 10 days.       The patient is to call for any concerns or return if any of the signs or symptoms worsen. The patient is to follow-up with PCP in the next 2-3 days for repeat evaluation repeat assessment or go directly to the emergency department.      SIGNATURE: Susan Cote III, PA-C

## 2023-07-19 ENCOUNTER — OFFICE VISIT (OUTPATIENT)
Dept: FAMILY MEDICINE CLINIC | Age: 33
End: 2023-07-19
Payer: COMMERCIAL

## 2023-07-19 VITALS
DIASTOLIC BLOOD PRESSURE: 74 MMHG | WEIGHT: 152 LBS | SYSTOLIC BLOOD PRESSURE: 132 MMHG | HEIGHT: 63 IN | TEMPERATURE: 97.6 F | HEART RATE: 52 BPM | OXYGEN SATURATION: 99 % | BODY MASS INDEX: 26.93 KG/M2

## 2023-07-19 DIAGNOSIS — H69.83 ACUTE DYSFUNCTION OF EUSTACHIAN TUBE, BILATERAL: Primary | ICD-10-CM

## 2023-07-19 PROCEDURE — 1036F TOBACCO NON-USER: CPT | Performed by: NURSE PRACTITIONER

## 2023-07-19 PROCEDURE — G8427 DOCREV CUR MEDS BY ELIG CLIN: HCPCS | Performed by: NURSE PRACTITIONER

## 2023-07-19 PROCEDURE — G8417 CALC BMI ABV UP PARAM F/U: HCPCS | Performed by: NURSE PRACTITIONER

## 2023-07-19 PROCEDURE — 99213 OFFICE O/P EST LOW 20 MIN: CPT | Performed by: NURSE PRACTITIONER

## 2023-07-19 RX ORDER — FLUTICASONE PROPIONATE 50 MCG
2 SPRAY, SUSPENSION (ML) NASAL DAILY
Qty: 16 G | Refills: 2 | Status: SHIPPED | OUTPATIENT
Start: 2023-07-19

## 2023-07-19 RX ORDER — CETIRIZINE HYDROCHLORIDE, PSEUDOEPHEDRINE HYDROCHLORIDE 5; 120 MG/1; MG/1
1 TABLET, FILM COATED, EXTENDED RELEASE ORAL 2 TIMES DAILY
Qty: 60 TABLET | Refills: 0 | Status: SHIPPED | OUTPATIENT
Start: 2023-07-19

## 2023-08-08 ENCOUNTER — HOSPITAL ENCOUNTER (OUTPATIENT)
Age: 33
Discharge: HOME | End: 2023-08-10

## 2023-08-08 ENCOUNTER — OUTSIDE SERVICES (OUTPATIENT)
Dept: OBGYN | Age: 33
End: 2023-08-08

## 2023-08-08 DIAGNOSIS — Z30.2 ENCOUNTER FOR TUBAL LIGATION: Primary | ICD-10-CM

## 2023-08-08 DIAGNOSIS — Z40.02 ENCOUNTER FOR PROPHYLACTIC SURGERY FOR RISK FACTOR RELATED TO MALIGNANT NEOPLASM OF OVARY: ICD-10-CM

## 2023-08-08 NOTE — OP NOTE
Simethicone 80 mg orally after meals and at bedtime as needed for gas pain. Call for fever, severe pain, bleeding heavier than a normal period or if you have any questions or concerns. Expect some shoulder pain, call if severe or not relieved with pain medications. You may ice your incisions the first 24 hours.      Office :[phone]     After Hours: [phone]    Dictated By: Dr. Rosalba Baires By: <Electronically signed by Dr. Shafer Query   08/08/23 1531       DD/DT: 08/08/23 1220

## 2023-08-11 LAB — SURGICAL PATHOLOGY REPORT: NORMAL

## 2023-12-07 ENCOUNTER — OFFICE VISIT (OUTPATIENT)
Dept: FAMILY MEDICINE CLINIC | Age: 33
End: 2023-12-07
Payer: COMMERCIAL

## 2023-12-07 VITALS
BODY MASS INDEX: 27.46 KG/M2 | WEIGHT: 155 LBS | DIASTOLIC BLOOD PRESSURE: 60 MMHG | OXYGEN SATURATION: 97 % | RESPIRATION RATE: 18 BRPM | HEIGHT: 63 IN | TEMPERATURE: 97.6 F | SYSTOLIC BLOOD PRESSURE: 122 MMHG | HEART RATE: 55 BPM

## 2023-12-07 DIAGNOSIS — J45.21 MILD INTERMITTENT ASTHMA WITH ACUTE EXACERBATION: ICD-10-CM

## 2023-12-07 DIAGNOSIS — B34.9 VIRAL ILLNESS: Primary | ICD-10-CM

## 2023-12-07 PROCEDURE — 1036F TOBACCO NON-USER: CPT | Performed by: NURSE PRACTITIONER

## 2023-12-07 PROCEDURE — G8484 FLU IMMUNIZE NO ADMIN: HCPCS | Performed by: NURSE PRACTITIONER

## 2023-12-07 PROCEDURE — G8427 DOCREV CUR MEDS BY ELIG CLIN: HCPCS | Performed by: NURSE PRACTITIONER

## 2023-12-07 PROCEDURE — 99213 OFFICE O/P EST LOW 20 MIN: CPT | Performed by: NURSE PRACTITIONER

## 2023-12-07 PROCEDURE — G8417 CALC BMI ABV UP PARAM F/U: HCPCS | Performed by: NURSE PRACTITIONER

## 2023-12-07 RX ORDER — DROSPIRENONE 4 MG/1
TABLET, FILM COATED ORAL
COMMUNITY
Start: 2023-10-27

## 2023-12-07 RX ORDER — PREDNISONE 20 MG/1
20 TABLET ORAL 2 TIMES DAILY
Qty: 10 TABLET | Refills: 0 | Status: SHIPPED | OUTPATIENT
Start: 2023-12-07 | End: 2023-12-12

## 2023-12-07 RX ORDER — ALBUTEROL SULFATE 90 UG/1
2 AEROSOL, METERED RESPIRATORY (INHALATION) EVERY 4 HOURS PRN
Qty: 1 EACH | Refills: 0 | Status: SHIPPED | OUTPATIENT
Start: 2023-12-07